# Patient Record
Sex: FEMALE | Race: WHITE | Employment: UNEMPLOYED | URBAN - METROPOLITAN AREA
[De-identification: names, ages, dates, MRNs, and addresses within clinical notes are randomized per-mention and may not be internally consistent; named-entity substitution may affect disease eponyms.]

---

## 2020-07-22 RX ORDER — CALCIUM CARBONATE/VITAMIN D3 600MG-5MCG
1 TABLET ORAL DAILY
Status: ON HOLD | COMMUNITY
End: 2020-07-23

## 2020-07-22 RX ORDER — LEVOTHYROXINE SODIUM 25 UG/1
25 TABLET ORAL
COMMUNITY

## 2020-07-22 RX ORDER — SIMVASTATIN 40 MG/1
TABLET, FILM COATED ORAL
Status: ON HOLD | COMMUNITY
End: 2020-07-23

## 2020-07-22 RX ORDER — CELECOXIB 100 MG/1
CAPSULE ORAL 2 TIMES DAILY
Status: ON HOLD | COMMUNITY
End: 2020-07-23

## 2020-07-22 RX ORDER — CONJUGATED ESTROGENS 0.62 MG/G
0.5 CREAM VAGINAL
COMMUNITY

## 2020-07-22 RX ORDER — LISINOPRIL 10 MG/1
TABLET ORAL DAILY
COMMUNITY

## 2020-07-22 RX ORDER — LANOLIN ALCOHOL/MO/W.PET/CERES
400 CREAM (GRAM) TOPICAL
COMMUNITY

## 2020-07-22 RX ORDER — FAMOTIDINE 40 MG/1
40 TABLET, FILM COATED ORAL 2 TIMES DAILY
Status: ON HOLD | COMMUNITY
End: 2020-07-23

## 2020-07-22 RX ORDER — THERA TABS 400 MCG
1 TAB ORAL DAILY
Status: ON HOLD | COMMUNITY
End: 2020-07-23

## 2020-07-22 RX ORDER — TESTOSTERONE 50 MG/5G
5 GEL TRANSDERMAL DAILY
Status: ON HOLD | COMMUNITY
End: 2020-07-23

## 2020-07-22 RX ORDER — OMEPRAZOLE 40 MG/1
40 CAPSULE, DELAYED RELEASE ORAL DAILY
Status: ON HOLD | COMMUNITY
End: 2020-07-23

## 2020-07-22 RX ORDER — CYCLOBENZAPRINE HCL 5 MG
5 TABLET ORAL
COMMUNITY

## 2020-07-22 RX ORDER — CYCLOSPORINE 0.5 MG/ML
1 EMULSION OPHTHALMIC EVERY 12 HOURS
Status: ON HOLD | COMMUNITY
End: 2020-07-23

## 2020-07-22 RX ORDER — TRAZODONE HYDROCHLORIDE 50 MG/1
TABLET ORAL
COMMUNITY

## 2020-07-23 ENCOUNTER — HOSPITAL ENCOUNTER (OUTPATIENT)
Age: 65
Discharge: HOME | End: 2020-07-24
Attending: INTERNAL MEDICINE | Admitting: INTERNAL MEDICINE
Payer: MEDICARE

## 2020-07-23 DIAGNOSIS — R94.39 ABNORMAL STRESS TEST: ICD-10-CM

## 2020-07-23 PROBLEM — Z95.5 STENTED CORONARY ARTERY: Status: ACTIVE | Noted: 2020-07-23

## 2020-07-23 LAB
ACT BLD: 406 SECS (ref 79–138)
ANION GAP SERPL CALC-SCNC: 5 MMOL/L (ref 3–18)
BUN SERPL-MCNC: 18 MG/DL (ref 7–18)
BUN/CREAT SERPL: 21 (ref 12–20)
CALCIUM SERPL-MCNC: 8.6 MG/DL (ref 8.5–10.1)
CHLORIDE SERPL-SCNC: 108 MMOL/L (ref 100–111)
CHOLEST SERPL-MCNC: 201 MG/DL
CO2 SERPL-SCNC: 27 MMOL/L (ref 21–32)
CREAT SERPL-MCNC: 0.86 MG/DL (ref 0.6–1.3)
ERYTHROCYTE [DISTWIDTH] IN BLOOD BY AUTOMATED COUNT: 12.6 % (ref 11.6–14.5)
GLUCOSE SERPL-MCNC: 87 MG/DL (ref 74–99)
HCT VFR BLD AUTO: 37.2 % (ref 35–45)
HCT VFR BLD AUTO: 39.6 % (ref 35–45)
HDLC SERPL-MCNC: 64 MG/DL (ref 40–60)
HDLC SERPL: 3.1 {RATIO} (ref 0–5)
HGB BLD-MCNC: 12.1 G/DL (ref 12–16)
HGB BLD-MCNC: 12.7 G/DL (ref 12–16)
INR PPP: 1 (ref 0.8–1.2)
LDLC SERPL CALC-MCNC: 116.4 MG/DL (ref 0–100)
LIPID PROFILE,FLP: ABNORMAL
MAGNESIUM SERPL-MCNC: 2.2 MG/DL (ref 1.6–2.6)
MCH RBC QN AUTO: 30.2 PG (ref 24–34)
MCHC RBC AUTO-ENTMCNC: 32.1 G/DL (ref 31–37)
MCV RBC AUTO: 94.3 FL (ref 74–97)
PLATELET # BLD AUTO: 178 K/UL (ref 135–420)
PMV BLD AUTO: 11.4 FL (ref 9.2–11.8)
POTASSIUM SERPL-SCNC: 4.2 MMOL/L (ref 3.5–5.5)
PROTHROMBIN TIME: 13.4 SEC (ref 11.5–15.2)
RBC # BLD AUTO: 4.2 M/UL (ref 4.2–5.3)
SODIUM SERPL-SCNC: 140 MMOL/L (ref 136–145)
TRIGL SERPL-MCNC: 103 MG/DL (ref ?–150)
VLDLC SERPL CALC-MCNC: 20.6 MG/DL
WBC # BLD AUTO: 4.8 K/UL (ref 4.6–13.2)

## 2020-07-23 PROCEDURE — 77030013519 HC DEV INFL BASIX MRTM -B: Performed by: INTERNAL MEDICINE

## 2020-07-23 PROCEDURE — 99218 HC RM OBSERVATION: CPT

## 2020-07-23 PROCEDURE — 77030004522 HC CATH ANGI DX EXPO BSC -A: Performed by: INTERNAL MEDICINE

## 2020-07-23 PROCEDURE — C9113 INJ PANTOPRAZOLE SODIUM, VIA: HCPCS | Performed by: INTERNAL MEDICINE

## 2020-07-23 PROCEDURE — C1894 INTRO/SHEATH, NON-LASER: HCPCS | Performed by: INTERNAL MEDICINE

## 2020-07-23 PROCEDURE — 99153 MOD SED SAME PHYS/QHP EA: CPT | Performed by: INTERNAL MEDICINE

## 2020-07-23 PROCEDURE — 80048 BASIC METABOLIC PNL TOTAL CA: CPT

## 2020-07-23 PROCEDURE — 77030008584 HC TOOL GDWRE DEV TERU -A: Performed by: INTERNAL MEDICINE

## 2020-07-23 PROCEDURE — 93005 ELECTROCARDIOGRAM TRACING: CPT

## 2020-07-23 PROCEDURE — 85610 PROTHROMBIN TIME: CPT

## 2020-07-23 PROCEDURE — 77030029997 HC DEV COM RDL R BND TELE -B: Performed by: INTERNAL MEDICINE

## 2020-07-23 PROCEDURE — 92928 PRQ TCAT PLMT NTRAC ST 1 LES: CPT | Performed by: INTERNAL MEDICINE

## 2020-07-23 PROCEDURE — 74011250636 HC RX REV CODE- 250/636: Performed by: INTERNAL MEDICINE

## 2020-07-23 PROCEDURE — 99152 MOD SED SAME PHYS/QHP 5/>YRS: CPT | Performed by: INTERNAL MEDICINE

## 2020-07-23 PROCEDURE — C1725 CATH, TRANSLUMIN NON-LASER: HCPCS | Performed by: INTERNAL MEDICINE

## 2020-07-23 PROCEDURE — 85027 COMPLETE CBC AUTOMATED: CPT

## 2020-07-23 PROCEDURE — 77030013797 HC KT TRNSDUC PRSSR EDWD -A: Performed by: INTERNAL MEDICINE

## 2020-07-23 PROCEDURE — 74011000258 HC RX REV CODE- 258: Performed by: INTERNAL MEDICINE

## 2020-07-23 PROCEDURE — C1769 GUIDE WIRE: HCPCS | Performed by: INTERNAL MEDICINE

## 2020-07-23 PROCEDURE — 74011000250 HC RX REV CODE- 250: Performed by: INTERNAL MEDICINE

## 2020-07-23 PROCEDURE — C1874 STENT, COATED/COV W/DEL SYS: HCPCS | Performed by: INTERNAL MEDICINE

## 2020-07-23 PROCEDURE — 80061 LIPID PANEL: CPT

## 2020-07-23 PROCEDURE — 74011250637 HC RX REV CODE- 250/637: Performed by: INTERNAL MEDICINE

## 2020-07-23 PROCEDURE — 36415 COLL VENOUS BLD VENIPUNCTURE: CPT

## 2020-07-23 PROCEDURE — 85014 HEMATOCRIT: CPT

## 2020-07-23 PROCEDURE — 74011636320 HC RX REV CODE- 636/320: Performed by: INTERNAL MEDICINE

## 2020-07-23 PROCEDURE — 93458 L HRT ARTERY/VENTRICLE ANGIO: CPT | Performed by: INTERNAL MEDICINE

## 2020-07-23 PROCEDURE — C1887 CATHETER, GUIDING: HCPCS | Performed by: INTERNAL MEDICINE

## 2020-07-23 PROCEDURE — 85347 COAGULATION TIME ACTIVATED: CPT

## 2020-07-23 PROCEDURE — 77030008543 HC TBNG MON PRSS MRTM -A: Performed by: INTERNAL MEDICINE

## 2020-07-23 PROCEDURE — 83735 ASSAY OF MAGNESIUM: CPT

## 2020-07-23 DEVICE — XIENCE SIERRA™ EVEROLIMUS ELUTING CORONARY STENT SYSTEM 2.50 MM X 28 MM / RAPID-EXCHANGE
Type: IMPLANTABLE DEVICE | Status: FUNCTIONAL
Brand: XIENCE SIERRA™

## 2020-07-23 DEVICE — XIENCE SIERRA™ EVEROLIMUS ELUTING CORONARY STENT SYSTEM 2.50 MM X 38 MM / RAPID-EXCHANGE
Type: IMPLANTABLE DEVICE | Status: FUNCTIONAL
Brand: XIENCE SIERRA™

## 2020-07-23 RX ORDER — SODIUM CHLORIDE 9 MG/ML
75 INJECTION, SOLUTION INTRAVENOUS CONTINUOUS
Status: DISPENSED | OUTPATIENT
Start: 2020-07-23 | End: 2020-07-23

## 2020-07-23 RX ORDER — METOPROLOL TARTRATE 25 MG/1
12.5 TABLET, FILM COATED ORAL EVERY 12 HOURS
Status: DISCONTINUED | OUTPATIENT
Start: 2020-07-24 | End: 2020-07-24 | Stop reason: HOSPADM

## 2020-07-23 RX ORDER — SODIUM CHLORIDE 0.9 % (FLUSH) 0.9 %
5-40 SYRINGE (ML) INJECTION AS NEEDED
Status: DISCONTINUED | OUTPATIENT
Start: 2020-07-23 | End: 2020-07-24 | Stop reason: HOSPADM

## 2020-07-23 RX ORDER — ATORVASTATIN CALCIUM 20 MG/1
20 TABLET, FILM COATED ORAL DAILY
Status: DISCONTINUED | OUTPATIENT
Start: 2020-07-24 | End: 2020-07-24 | Stop reason: HOSPADM

## 2020-07-23 RX ORDER — ENOXAPARIN SODIUM 100 MG/ML
40 INJECTION SUBCUTANEOUS EVERY 24 HOURS
Status: DISCONTINUED | OUTPATIENT
Start: 2020-07-24 | End: 2020-07-24 | Stop reason: HOSPADM

## 2020-07-23 RX ORDER — ONDANSETRON 2 MG/ML
4 INJECTION INTRAMUSCULAR; INTRAVENOUS ONCE
Status: ACTIVE | OUTPATIENT
Start: 2020-07-23 | End: 2020-07-24

## 2020-07-23 RX ORDER — SODIUM CHLORIDE 0.9 % (FLUSH) 0.9 %
5-40 SYRINGE (ML) INJECTION EVERY 8 HOURS
Status: DISCONTINUED | OUTPATIENT
Start: 2020-07-23 | End: 2020-07-24 | Stop reason: HOSPADM

## 2020-07-23 RX ORDER — GUAIFENESIN 100 MG/5ML
81 LIQUID (ML) ORAL ONCE
Status: COMPLETED | OUTPATIENT
Start: 2020-07-23 | End: 2020-07-23

## 2020-07-23 RX ORDER — PANTOPRAZOLE SODIUM 40 MG/1
40 TABLET, DELAYED RELEASE ORAL DAILY
COMMUNITY

## 2020-07-23 RX ORDER — FENTANYL CITRATE 50 UG/ML
INJECTION, SOLUTION INTRAMUSCULAR; INTRAVENOUS AS NEEDED
Status: DISCONTINUED | OUTPATIENT
Start: 2020-07-23 | End: 2020-07-23 | Stop reason: HOSPADM

## 2020-07-23 RX ORDER — LEVOTHYROXINE SODIUM 25 UG/1
25 TABLET ORAL
Status: DISCONTINUED | OUTPATIENT
Start: 2020-07-24 | End: 2020-07-24 | Stop reason: HOSPADM

## 2020-07-23 RX ORDER — HEPARIN SODIUM 1000 [USP'U]/ML
INJECTION, SOLUTION INTRAVENOUS; SUBCUTANEOUS AS NEEDED
Status: DISCONTINUED | OUTPATIENT
Start: 2020-07-23 | End: 2020-07-23 | Stop reason: HOSPADM

## 2020-07-23 RX ORDER — ASPIRIN 81 MG/1
81 TABLET ORAL DAILY
Status: DISCONTINUED | OUTPATIENT
Start: 2020-07-24 | End: 2020-07-24 | Stop reason: HOSPADM

## 2020-07-23 RX ORDER — LIDOCAINE HYDROCHLORIDE 10 MG/ML
INJECTION INFILTRATION; PERINEURAL AS NEEDED
Status: DISCONTINUED | OUTPATIENT
Start: 2020-07-23 | End: 2020-07-23 | Stop reason: HOSPADM

## 2020-07-23 RX ORDER — LISINOPRIL 5 MG/1
10 TABLET ORAL DAILY
Status: DISCONTINUED | OUTPATIENT
Start: 2020-07-24 | End: 2020-07-24 | Stop reason: HOSPADM

## 2020-07-23 RX ORDER — VERAPAMIL HYDROCHLORIDE 2.5 MG/ML
INJECTION, SOLUTION INTRAVENOUS AS NEEDED
Status: DISCONTINUED | OUTPATIENT
Start: 2020-07-23 | End: 2020-07-23 | Stop reason: HOSPADM

## 2020-07-23 RX ORDER — HEPARIN SODIUM 200 [USP'U]/100ML
INJECTION, SOLUTION INTRAVENOUS
Status: COMPLETED | OUTPATIENT
Start: 2020-07-23 | End: 2020-07-23

## 2020-07-23 RX ORDER — MIDAZOLAM HYDROCHLORIDE 1 MG/ML
INJECTION, SOLUTION INTRAMUSCULAR; INTRAVENOUS AS NEEDED
Status: DISCONTINUED | OUTPATIENT
Start: 2020-07-23 | End: 2020-07-23 | Stop reason: HOSPADM

## 2020-07-23 RX ORDER — SODIUM CHLORIDE 9 MG/ML
25 INJECTION, SOLUTION INTRAVENOUS CONTINUOUS
Status: DISCONTINUED | OUTPATIENT
Start: 2020-07-23 | End: 2020-07-23

## 2020-07-23 RX ADMIN — BIVALIRUDIN: 250 INJECTION, POWDER, LYOPHILIZED, FOR SOLUTION INTRAVENOUS at 10:40

## 2020-07-23 RX ADMIN — SODIUM CHLORIDE 75 ML/HR: 900 INJECTION, SOLUTION INTRAVENOUS at 12:33

## 2020-07-23 RX ADMIN — ASPIRIN 81 MG 81 MG: 81 TABLET ORAL at 08:32

## 2020-07-23 RX ADMIN — SODIUM CHLORIDE 25 ML/HR: 900 INJECTION, SOLUTION INTRAVENOUS at 08:30

## 2020-07-23 RX ADMIN — PANTOPRAZOLE SODIUM 40 MG: 40 INJECTION, POWDER, FOR SOLUTION INTRAVENOUS at 10:20

## 2020-07-23 RX ADMIN — TICAGRELOR 90 MG: 90 TABLET ORAL at 23:31

## 2020-07-23 NOTE — Clinical Note
TRANSFER - IN REPORT:     Verbal report received from: rn.     Report consisted of patient's Situation, Background, Assessment and   Recommendations(SBAR). Opportunity for questions and clarification was provided. Assessment completed upon patient's arrival to unit and care assumed. Patient transported with a Registered Nurse and [address] [place] [address] / [facility].

## 2020-07-23 NOTE — PROGRESS NOTES
Checked access site. No hematoma noted. Pt mentioned base of thumb feeling a bit numb. Distal radial pulse in tact and Pulse O2 still registering 100%.

## 2020-07-23 NOTE — Clinical Note
Stent inserted. Stent deployed. Multiple inflations used. First inflation pressure = 9 millie; inflation time: 11 sec.

## 2020-07-23 NOTE — PROGRESS NOTES
Pt is all prepped and ready for procedure. 0925 Pt picked up for procedure. 1050 Pt back to care unit S/P cardiac cath. Pt awake and alert and tolerated procedure well. Tr band to rt wrist with immobilizer in place. Site WNL. 1215 Pt noted vagaling, low HR , in the 40's ,B/P 69/48  c/o nausea and not feeling well. Pt placed in trendelenburg and Zofran 4 mg adm stat. IVF wide open. DR Zaragoza called asap    3265 B/P 92/41 , Dr Justine Catalan @ bedside speaking with patient. 1230 B/p 104/53, HR 55, spo2 99%. Pt stated \" I feel much better\" EKG  Done @ bedside. Pt continue to feel better , no further events    1700 Bed assigned and patient taken to room 337 to be admitted.

## 2020-07-23 NOTE — Clinical Note
TRANSFER - OUT REPORT:     Verbal report given to: rn.     Report consisted of patient's Situation, Background, Assessment and   Recommendations(SBAR). Opportunity for questions and clarification was provided. Patient transported with a Registered Nurse and [address] [place] [address] / [facility] [employer]. Patient transported to: care unit.

## 2020-07-23 NOTE — PROGRESS NOTES
TRANSFER - IN REPORT:    Verbal report received from [address] RN(name) on Yvon Quiroz  being received from Care Unit (unit) for routine progression of care      Report consisted of patients Situation, Background, Assessment and   Recommendations(SBAR). Information from the following report(s) SBAR, Kardex, ED Summary, Intake/Output, MAR and Accordion was reviewed with the receiving nurse. Opportunity for questions and clarification was provided. Assessment completed upon patients arrival to unit and care assumed. Shift Summary: Shift uneventful. No complaints of chest pain or shortness of breath. Call light is within reach.

## 2020-07-23 NOTE — PROGRESS NOTES
Patient and post procedure report received. Lunch provided to patient and tolerating well. Angiomax to run until 12:47pm and air to be released from TR band starting at 13:47.

## 2020-07-23 NOTE — PROGRESS NOTES
Bedside shift change report given to Johanna Chiu RN (oncoming nurse) by Edilson Hidalgo RN (offgoing nurse). Report included the following information SBAR, Kardex and ED Summary.

## 2020-07-23 NOTE — PROGRESS NOTES
Cardiology Progress Note        Patient: Raul Batres        Sex: female          DOA: 7/23/2020  YOB: 1955      Age:  72 y.o.        LOS:  LOS: 0 days   Assessment/Plan   Date of Surgery Update:  Raul Batres was seen and examined. History and physical has been reviewed. The patient has been examined.  There have been no significant clinical changes since the completion of the originally dated History and Physical.    Signed By: David Chawla MD     July 23, 2020 9:34 AM             Signed By: David Chawla MD     July 23, 2020

## 2020-07-23 NOTE — PROGRESS NOTES
Started to release TR band at 13:47. Pt c/o some bruising and tenderness however, no hematoma was present. Tolerating procedure well and will release 2cc air every 5 mins until complete.

## 2020-07-23 NOTE — Clinical Note
Balloon inserted. Balloon inflated using multiple inflations inflation technique. Lesion #1: Pressure = 6 millie; Duration = 10 sec. Inflation 2: Pressure = 6 millie; Duration = 7 sec.

## 2020-07-23 NOTE — PROGRESS NOTES
Pt resting comfortably pending room assignment. Radial pulse is strong and intact (2+) above and below percutaneous stick site.

## 2020-07-23 NOTE — DISCHARGE INSTRUCTIONS
Cardiac Catheterization/Angiography Discharge Instructions 7/23/2020    *Check the puncture site frequently for swelling or bleeding. If you see any bleeding, lie down and apply pressure over the area with a clean town or washcloth. Notify your doctor for any redness, swelling, drainage or oozing from the puncture site. Notify your doctor for any fever or chills. *If the leg or arm with the puncture becomes cold, numb or painful, call Dr Alisha Cruz     *Activity should be limited for the next 48 hours. Climb stairs as little as possible and avoid any stooping, bending or strenuous activity for 48 hours. No heavy lifting (anything over 10 pounds) for three days. *Do not drive for 48 hours. *You may resume your usual diet. Drink more fluids than usual.    *Have a responsible person drive you home and stay with you for at least 24 hours after your heart catheterization/angiography. *You may remove the bandage from your Right and Arm in 24 hours. You may shower in 24 hours. No tub baths, hot tubs or swimming for one week. Do not place any lotions, creams, powders, ointments over the puncture site for one week. You may place a clean band-aid over the puncture site each day for 5 days. Change this daily.

## 2020-07-23 NOTE — Clinical Note
Stent inserted. Stent deployed. Single technique and multiple inflations used. First inflation pressure = 9 millie; inflation time: 11 sec.

## 2020-07-23 NOTE — Clinical Note
Bilateral groin and right radial clipped prepped with ChloraPrep and draped. Wet prep elapsed drying time: 5 mins.

## 2020-07-23 NOTE — ROUTINE PROCESS
Cardiac Cath Lab:  Pre Procedure Chart Check     Patients chart was accessed and reviewed for possible and/or scheduled procedure. Creatinine Clearance:  Serum creatinine: 0.86 mg/dL 07/23/20 0755  Estimated creatinine clearance: 71.1 mL/min    Total Contrast  Load:  3 x estimated clearance amount=  213.3ml    75% of Contrast Load:  0.75 x Total Contrast Load=    159.9ml    Recent Labs     07/23/20  0755   WBC 4.8   RBC 4.20   HCT 39.6   HGB 12.7         K 4.2   BUN 18   CREA 0.86   GFRAA >60   GFRNA >60   CA 8.6       BMI: Body mass index is 29.86 kg/m². ALLERGIES:   Allergies   Allergen Reactions    Clotrimazole Other (comments)     Burning of vagina    Keflex [Cephalexin] Other (comments)     Yeast infections    Miconazole Nitrate Other (comments)     Burning of vagina       Lines:        Peripheral IV 07/23/20 Left Antecubital (Active)   Site Assessment Clean, dry, & intact 07/23/20 0825   Phlebitis Assessment 0 07/23/20 0825   Infiltration Assessment 0 07/23/20 0825   Dressing Status Clean, dry, & intact 07/23/20 0825   Dressing Type Tape;Transparent 07/23/20 0825   Hub Color/Line Status Capped; Infusing;Pink;Flushed 07/23/20 0825   Action Taken Open ports on tubing capped 07/23/20 0825   Alcohol Cap Used Yes 07/23/20 0825          History:    Past Medical History:   Diagnosis Date    Arthritis     hands    Chronic pain     right SI joint    GERD (gastroesophageal reflux disease)     Hypertension     Other ill-defined conditions(799.89)     high cholestrol     Past Surgical History:   Procedure Laterality Date    HX GI      colonoscopy    HX HYSTERECTOMY  2006    TVH    HX OOPHORECTOMY Bilateral 2006    HX SHOULDER ARTHROSCOPY Left 2011    HX SHOULDER ARTHROSCOPY Right 2019    HX TONSILLECTOMY  1976    HX UROLOGICAL      cystoscopy    AR BIOPSY OF BREAST, INCISIONAL Right     fibroadenoma     Patient Active Problem List   Diagnosis Code    GERD (gastroesophageal reflux disease) K21.9    Diarrhea R19.7

## 2020-07-23 NOTE — Clinical Note
Balloon inserted. Balloon inflated using multiple inflations inflation technique. Lesion #1: Pressure = 12 millie; Duration = 12 sec. Inflation 2: Pressure = 12 millie; Duration = 7 sec. Inflation 3: Pressure = 12 millie; Duration = 5 sec. Inflation 4: Pressure = 16 millie; Duration = 15 sec.

## 2020-07-23 NOTE — Clinical Note
Balloon inserted. Balloon inflated using multiple inflations inflation technique. Lesion #1: Pressure = 6 millie; Duration = 10 sec. Inflation 2: Pressure = 6 millie; Duration = 10 sec. Inflation 3: Pressure = 6 millie; Duration = 6 sec. Inflation 4: Pressure = 6 millie; Duration = 6 sec. Inflation 5: Pressure = 6 millie; Duration = 5 sec. Inflation 6: Pressure = 6 millie; Duration = 5 sec. Inflation 7: Pressure = 8 millie; Duration = 5 sec.

## 2020-07-23 NOTE — PROGRESS NOTES
TR band completely released. Dry steriled dressing applied and hand rotated to prone position in wrist splint. Pt Tolerated procedure well. No hematoma noted. Tender on palpation.

## 2020-07-24 VITALS
DIASTOLIC BLOOD PRESSURE: 56 MMHG | RESPIRATION RATE: 18 BRPM | OXYGEN SATURATION: 98 % | TEMPERATURE: 98.5 F | SYSTOLIC BLOOD PRESSURE: 97 MMHG | BODY MASS INDEX: 29.73 KG/M2 | HEART RATE: 65 BPM | HEIGHT: 66 IN | WEIGHT: 185 LBS

## 2020-07-24 LAB
ANION GAP SERPL CALC-SCNC: 6 MMOL/L (ref 3–18)
ATRIAL RATE: 54 BPM
ATRIAL RATE: 56 BPM
BUN SERPL-MCNC: 14 MG/DL (ref 7–18)
BUN/CREAT SERPL: 18 (ref 12–20)
CALCIUM SERPL-MCNC: 8 MG/DL (ref 8.5–10.1)
CALCULATED P AXIS, ECG09: 62 DEGREES
CALCULATED P AXIS, ECG09: 63 DEGREES
CALCULATED R AXIS, ECG10: 52 DEGREES
CALCULATED R AXIS, ECG10: 68 DEGREES
CALCULATED T AXIS, ECG11: 73 DEGREES
CALCULATED T AXIS, ECG11: 79 DEGREES
CHLORIDE SERPL-SCNC: 111 MMOL/L (ref 100–111)
CO2 SERPL-SCNC: 26 MMOL/L (ref 21–32)
CRD SYSTOLIC BP: 128
CREAT SERPL-MCNC: 0.78 MG/DL (ref 0.6–1.3)
DIAGNOSIS, 93000: NORMAL
DIAGNOSIS, 93000: NORMAL
ERYTHROCYTE [DISTWIDTH] IN BLOOD BY AUTOMATED COUNT: 12.8 % (ref 11.6–14.5)
GLUCOSE SERPL-MCNC: 91 MG/DL (ref 74–99)
HCT VFR BLD AUTO: 37.1 % (ref 35–45)
HGB BLD-MCNC: 11.7 G/DL (ref 12–16)
MCH RBC QN AUTO: 30.3 PG (ref 24–34)
MCHC RBC AUTO-ENTMCNC: 31.5 G/DL (ref 31–37)
MCV RBC AUTO: 96.1 FL (ref 74–97)
P-R INTERVAL, ECG05: 148 MS
P-R INTERVAL, ECG05: 164 MS
PLATELET # BLD AUTO: 163 K/UL (ref 135–420)
PMV BLD AUTO: 11.4 FL (ref 9.2–11.8)
POTASSIUM SERPL-SCNC: 4 MMOL/L (ref 3.5–5.5)
Q-T INTERVAL, ECG07: 458 MS
Q-T INTERVAL, ECG07: 458 MS
QRS DURATION, ECG06: 80 MS
QRS DURATION, ECG06: 84 MS
QTC CALCULATION (BEZET), ECG08: 434 MS
QTC CALCULATION (BEZET), ECG08: 441 MS
RBC # BLD AUTO: 3.86 M/UL (ref 4.2–5.3)
SODIUM SERPL-SCNC: 143 MMOL/L (ref 136–145)
VENTRICULAR RATE, ECG03: 54 BPM
VENTRICULAR RATE, ECG03: 56 BPM
WBC # BLD AUTO: 6.6 K/UL (ref 4.6–13.2)

## 2020-07-24 PROCEDURE — 85027 COMPLETE CBC AUTOMATED: CPT

## 2020-07-24 PROCEDURE — 99218 HC RM OBSERVATION: CPT

## 2020-07-24 PROCEDURE — 74011250637 HC RX REV CODE- 250/637: Performed by: INTERNAL MEDICINE

## 2020-07-24 PROCEDURE — 36415 COLL VENOUS BLD VENIPUNCTURE: CPT

## 2020-07-24 PROCEDURE — 80048 BASIC METABOLIC PNL TOTAL CA: CPT

## 2020-07-24 PROCEDURE — 74011250636 HC RX REV CODE- 250/636: Performed by: INTERNAL MEDICINE

## 2020-07-24 RX ADMIN — ATORVASTATIN CALCIUM 20 MG: 20 TABLET, FILM COATED ORAL at 09:20

## 2020-07-24 RX ADMIN — Medication 10 ML: at 09:21

## 2020-07-24 RX ADMIN — TICAGRELOR 90 MG: 90 TABLET ORAL at 11:30

## 2020-07-24 RX ADMIN — ASPIRIN 81 MG: 81 TABLET, COATED ORAL at 09:20

## 2020-07-24 RX ADMIN — Medication 10 ML: at 15:13

## 2020-07-24 RX ADMIN — LEVOTHYROXINE SODIUM 25 MCG: 25 TABLET ORAL at 07:30

## 2020-07-24 RX ADMIN — LISINOPRIL 10 MG: 5 TABLET ORAL at 09:20

## 2020-07-24 RX ADMIN — ENOXAPARIN SODIUM 40 MG: 40 INJECTION SUBCUTANEOUS at 09:20

## 2020-07-24 NOTE — PROGRESS NOTES
Reason for Admission:   Abnormal stress test                   RUR Score:     low                Plan for utilizing home health:      no    PCP: First and Last name:  [id number] Susana Name of Practice:    Are you a current patient: Yes/No:    Approximate date of last visit:    Can you participate in a virtual visit with your PCP:                     Current Advanced Directive/Advance Care Plan: On chart  Transition of Care Plan:     Chart reviewed and spoke with pt at bedside, cm introduced self and explained cm role as d/c planner, pt informed cm that when discharged she plans to return back home with spouse, pt admitted due to abnormal stress test, cath lab procedure done yesterday, when discharged pt will f/u with  and cardiac rehab,pt denies any home DME's prior to admission, independent with care, cm will remain available .   Care Management Interventions  PCP Verified by CM: Yes(pascual)  Palliative Care Criteria Met (RRAT>21 & CHF Dx)?: No  Mode of Transport at Discharge: Self(spouse)  Current Support Network: Lives with Spouse  Confirm Follow Up Transport: Self  The Plan for Transition of Care is Related to the Following Treatment Goals : returning back home with spouse /cardiac rehab  Discharge Location  Discharge Placement: Home

## 2020-07-24 NOTE — PROGRESS NOTES
Bedside shift change report given to [address] [place] [address] (oncoming nurse) by Gaurav Valentine RN  (offgoing nurse). Report included the following information SBAR, Kardex, ED Summary, Intake/Output, MAR and Accordion. 0900 Shift assessment complete. Dual AVS reviewed with Margie DIAL RN. All medications reviewed individually with patient. Opportunities for questions and concerns provided. Patient discharged via (mode of transport ie. Car, ambulance or air transport) car with family. Patient's arm band appropriately discarded. Shift Summary: Shift uneventful. No complaints of chest pain or shortness of breath.

## 2020-07-24 NOTE — PROGRESS NOTES
1915  Assumed care of pt   2000  Shift assessment complete   0000  Reassessment complete   0400  Reassessment complete       Shift uneventful     Bedside and Verbal shift change report given to Madan Fitch RN (oncoming nurse) by Nelson Pearson RN (offgoing nurse). Report included the following information SBAR, Kardex, ED Summary, Intake/Output, MAR, Recent Results, Med Rec Status and Cardiac Rhythm NSR.

## 2022-03-19 PROBLEM — Z95.5 STENTED CORONARY ARTERY: Status: ACTIVE | Noted: 2020-07-23

## 2024-05-02 ENCOUNTER — HOSPITAL ENCOUNTER (INPATIENT)
Facility: HOSPITAL | Age: 69
LOS: 10 days | Discharge: HOME | DRG: 478 | End: 2024-05-13
Attending: STUDENT IN AN ORGANIZED HEALTH CARE EDUCATION/TRAINING PROGRAM | Admitting: INTERNAL MEDICINE
Payer: MEDICARE

## 2024-05-02 DIAGNOSIS — D64.9 ANEMIA, UNSPECIFIED TYPE: ICD-10-CM

## 2024-05-02 DIAGNOSIS — R26.2 UNABLE TO WALK: ICD-10-CM

## 2024-05-02 DIAGNOSIS — R91.8 MULTIPLE LUNG NODULES: ICD-10-CM

## 2024-05-02 DIAGNOSIS — C79.51 METASTATIC CANCER TO BONE (HCC): ICD-10-CM

## 2024-05-02 DIAGNOSIS — E83.52 HYPERCALCEMIA: ICD-10-CM

## 2024-05-02 DIAGNOSIS — R53.1 GENERALIZED WEAKNESS: Primary | ICD-10-CM

## 2024-05-02 DIAGNOSIS — E27.8 ADRENAL NODULE (HCC): ICD-10-CM

## 2024-05-02 DIAGNOSIS — Z95.5 STENTED CORONARY ARTERY: ICD-10-CM

## 2024-05-02 PROCEDURE — 87086 URINE CULTURE/COLONY COUNT: CPT

## 2024-05-02 PROCEDURE — 87186 SC STD MICRODIL/AGAR DIL: CPT

## 2024-05-02 PROCEDURE — 99152 MOD SED SAME PHYS/QHP 5/>YRS: CPT

## 2024-05-02 PROCEDURE — 99285 EMERGENCY DEPT VISIT HI MDM: CPT

## 2024-05-02 PROCEDURE — 87088 URINE BACTERIA CULTURE: CPT

## 2024-05-02 PROCEDURE — 81001 URINALYSIS AUTO W/SCOPE: CPT

## 2024-05-02 RX ORDER — KETOROLAC TROMETHAMINE 15 MG/ML
15 INJECTION, SOLUTION INTRAMUSCULAR; INTRAVENOUS ONCE
Status: COMPLETED | OUTPATIENT
Start: 2024-05-03 | End: 2024-05-03

## 2024-05-02 RX ORDER — 0.9 % SODIUM CHLORIDE 0.9 %
1000 INTRAVENOUS SOLUTION INTRAVENOUS ONCE
Status: COMPLETED | OUTPATIENT
Start: 2024-05-03 | End: 2024-05-03

## 2024-05-02 ASSESSMENT — PAIN SCALES - GENERAL: PAINLEVEL_OUTOF10: 8

## 2024-05-03 ENCOUNTER — APPOINTMENT (OUTPATIENT)
Facility: HOSPITAL | Age: 69
DRG: 478 | End: 2024-05-03
Payer: MEDICARE

## 2024-05-03 ENCOUNTER — APPOINTMENT (OUTPATIENT)
Facility: HOSPITAL | Age: 69
DRG: 478 | End: 2024-05-03
Attending: INTERNAL MEDICINE
Payer: MEDICARE

## 2024-05-03 PROBLEM — C79.51 METASTATIC CANCER TO BONE (HCC): Status: ACTIVE | Noted: 2024-05-03

## 2024-05-03 PROBLEM — N39.0 UTI (URINARY TRACT INFECTION): Status: ACTIVE | Noted: 2024-05-03

## 2024-05-03 PROBLEM — E27.8 ADRENAL NODULE (HCC): Status: ACTIVE | Noted: 2024-05-03

## 2024-05-03 PROBLEM — E83.52 HYPERCALCEMIA: Status: ACTIVE | Noted: 2024-05-03

## 2024-05-03 PROBLEM — R91.1 PULMONARY NODULE: Status: ACTIVE | Noted: 2024-05-03

## 2024-05-03 LAB
ALBUMIN SERPL-MCNC: 2.3 G/DL (ref 3.4–5)
ALBUMIN SERPL-MCNC: 2.4 G/DL (ref 3.4–5)
ALBUMIN/GLOB SERPL: 0.6 (ref 0.8–1.7)
ALBUMIN/GLOB SERPL: 0.7 (ref 0.8–1.7)
ALP SERPL-CCNC: 111 U/L (ref 45–117)
ALP SERPL-CCNC: 115 U/L (ref 45–117)
ALT SERPL-CCNC: 14 U/L (ref 13–56)
ALT SERPL-CCNC: 14 U/L (ref 13–56)
ANION GAP SERPL CALC-SCNC: 7 MMOL/L (ref 3–18)
ANION GAP SERPL CALC-SCNC: 8 MMOL/L (ref 3–18)
APPEARANCE UR: CLEAR
APTT PPP: 156.8 SEC (ref 23–36.4)
APTT PPP: 26 SEC (ref 23–36.4)
AST SERPL-CCNC: 10 U/L (ref 10–38)
AST SERPL-CCNC: 11 U/L (ref 10–38)
BACTERIA URNS QL MICRO: ABNORMAL /HPF
BASOPHILS # BLD: 0 K/UL (ref 0–0.1)
BASOPHILS NFR BLD: 0 % (ref 0–2)
BILIRUB SERPL-MCNC: 0.3 MG/DL (ref 0.2–1)
BILIRUB SERPL-MCNC: 0.3 MG/DL (ref 0.2–1)
BILIRUB UR QL: NEGATIVE
BUN SERPL-MCNC: 10 MG/DL (ref 7–18)
BUN SERPL-MCNC: 11 MG/DL (ref 7–18)
BUN/CREAT SERPL: 12 (ref 12–20)
BUN/CREAT SERPL: 14 (ref 12–20)
CA-I SERPL-SCNC: 1.41 MMOL/L (ref 1.12–1.32)
CA-I SERPL-SCNC: 1.41 MMOL/L (ref 1.12–1.32)
CALCIUM SERPL-MCNC: 10.1 MG/DL (ref 8.5–10.1)
CALCIUM SERPL-MCNC: 10.8 MG/DL (ref 8.5–10.1)
CALCIUM SERPL-MCNC: 11 MG/DL (ref 8.5–10.1)
CHLORIDE SERPL-SCNC: 101 MMOL/L (ref 100–111)
CHLORIDE SERPL-SCNC: 104 MMOL/L (ref 100–111)
CK SERPL-CCNC: 14 U/L (ref 26–192)
CO2 SERPL-SCNC: 27 MMOL/L (ref 21–32)
CO2 SERPL-SCNC: 27 MMOL/L (ref 21–32)
COLOR UR: YELLOW
CREAT SERPL-MCNC: 0.79 MG/DL (ref 0.6–1.3)
CREAT SERPL-MCNC: 0.82 MG/DL (ref 0.6–1.3)
D DIMER PPP FEU-MCNC: 4.3 UG/ML(FEU)
DIFFERENTIAL METHOD BLD: ABNORMAL
ECHO AO ROOT DIAM: 3.2 CM
ECHO AO ROOT INDEX: 1.64 CM/M2
ECHO AV AREA PEAK VELOCITY: 2.7 CM2
ECHO AV AREA VTI: 2.9 CM2
ECHO AV AREA/BSA PEAK VELOCITY: 1.4 CM2/M2
ECHO AV AREA/BSA VTI: 1.5 CM2/M2
ECHO AV MEAN GRADIENT: 4 MMHG
ECHO AV MEAN VELOCITY: 0.9 M/S
ECHO AV PEAK GRADIENT: 7 MMHG
ECHO AV PEAK VELOCITY: 1.3 M/S
ECHO AV VELOCITY RATIO: 0.85
ECHO AV VTI: 27.2 CM
ECHO BSA: 2 M2
ECHO BSA: 2 M2
ECHO EST RA PRESSURE: 3 MMHG
ECHO LA DIAMETER INDEX: 1.95 CM/M2
ECHO LA DIAMETER: 3.8 CM
ECHO LA TO AORTIC ROOT RATIO: 1.19
ECHO LA VOL A-L A2C: 53 ML (ref 22–52)
ECHO LA VOL A-L A4C: 56 ML (ref 22–52)
ECHO LA VOL BP: 53 ML (ref 22–52)
ECHO LA VOL MOD A2C: 51 ML (ref 22–52)
ECHO LA VOL MOD A4C: 54 ML (ref 22–52)
ECHO LA VOL/BSA BIPLANE: 27 ML/M2 (ref 16–34)
ECHO LA VOLUME AREA LENGTH: 55 ML
ECHO LA VOLUME INDEX A-L A2C: 27 ML/M2 (ref 16–34)
ECHO LA VOLUME INDEX A-L A4C: 29 ML/M2 (ref 16–34)
ECHO LA VOLUME INDEX AREA LENGTH: 28 ML/M2 (ref 16–34)
ECHO LA VOLUME INDEX MOD A2C: 26 ML/M2 (ref 16–34)
ECHO LA VOLUME INDEX MOD A4C: 28 ML/M2 (ref 16–34)
ECHO LV E' LATERAL VELOCITY: 12 CM/S
ECHO LV E' SEPTAL VELOCITY: 39 CM/S
ECHO LV EDV A2C: 24 ML
ECHO LV EDV A4C: 80 ML
ECHO LV EDV BP: 44 ML (ref 56–104)
ECHO LV EDV INDEX A4C: 41 ML/M2
ECHO LV EDV INDEX BP: 23 ML/M2
ECHO LV EDV NDEX A2C: 12 ML/M2
ECHO LV EJECTION FRACTION A2C: 53 %
ECHO LV EJECTION FRACTION A4C: 56 %
ECHO LV EJECTION FRACTION BIPLANE: 52 % (ref 55–100)
ECHO LV ESV A2C: 12 ML
ECHO LV ESV A4C: 35 ML
ECHO LV ESV BP: 21 ML (ref 19–49)
ECHO LV ESV INDEX A2C: 6 ML/M2
ECHO LV ESV INDEX A4C: 18 ML/M2
ECHO LV ESV INDEX BP: 11 ML/M2
ECHO LV FRACTIONAL SHORTENING: 32 % (ref 28–44)
ECHO LV INTERNAL DIMENSION DIASTOLE INDEX: 2.56 CM/M2
ECHO LV INTERNAL DIMENSION DIASTOLIC: 5 CM (ref 3.9–5.3)
ECHO LV INTERNAL DIMENSION SYSTOLIC INDEX: 1.74 CM/M2
ECHO LV INTERNAL DIMENSION SYSTOLIC: 3.4 CM
ECHO LV IVSD: 1 CM (ref 0.6–0.9)
ECHO LV MASS 2D: 182 G (ref 67–162)
ECHO LV MASS INDEX 2D: 93.3 G/M2 (ref 43–95)
ECHO LV POSTERIOR WALL DIASTOLIC: 1 CM (ref 0.6–0.9)
ECHO LV RELATIVE WALL THICKNESS RATIO: 0.4
ECHO LVOT AREA: 3.5 CM2
ECHO LVOT AV VTI INDEX: 0.87
ECHO LVOT DIAM: 2.1 CM
ECHO LVOT MEAN GRADIENT: 2 MMHG
ECHO LVOT PEAK GRADIENT: 4 MMHG
ECHO LVOT PEAK VELOCITY: 1.1 M/S
ECHO LVOT STROKE VOLUME INDEX: 42.1 ML/M2
ECHO LVOT SV: 82 ML
ECHO LVOT VTI: 23.7 CM
ECHO MV A VELOCITY: 0.91 M/S
ECHO MV AREA PHT: 4.1 CM2
ECHO MV AREA VTI: 3.8 CM2
ECHO MV E DECELERATION TIME (DT): 165.6 MS
ECHO MV E VELOCITY: 0.74 M/S
ECHO MV E/A RATIO: 0.81
ECHO MV E/E' LATERAL: 6.17
ECHO MV E/E' RATIO (AVERAGED): 4.03
ECHO MV LVOT VTI INDEX: 0.9
ECHO MV MAX VELOCITY: 0.9 M/S
ECHO MV MEAN GRADIENT: 2 MMHG
ECHO MV MEAN VELOCITY: 0.7 M/S
ECHO MV PEAK GRADIENT: 3 MMHG
ECHO MV PRESSURE HALF TIME (PHT): 54.1 MS
ECHO MV VTI: 21.4 CM
ECHO PV MAX VELOCITY: 1.1 M/S
ECHO PV PEAK GRADIENT: 5 MMHG
ECHO RA END SYSTOLIC VOLUME APICAL 4 CHAMBER INDEX BSA: 12 ML/M2
ECHO RA VOLUME: 23 ML
ECHO RIGHT VENTRICULAR SYSTOLIC PRESSURE (RVSP): 36 MMHG
ECHO RV FREE WALL PEAK S': 21 CM/S
ECHO RV INTERNAL DIMENSION: 3.4 CM
ECHO RV TAPSE: 2 CM (ref 1.7–?)
ECHO RVOT MEAN GRADIENT: 2 MMHG
ECHO RVOT PEAK GRADIENT: 2 MMHG
ECHO RVOT PEAK VELOCITY: 0.8 M/S
ECHO RVOT VTI: 16.2 CM
ECHO TV REGURGITANT MAX VELOCITY: 2.86 M/S
ECHO TV REGURGITANT PEAK GRADIENT: 33 MMHG
EOSINOPHIL # BLD: 0.1 K/UL (ref 0–0.4)
EOSINOPHIL NFR BLD: 1 % (ref 0–5)
EPITH CASTS URNS QL MICRO: ABNORMAL /LPF (ref 0–5)
ERYTHROCYTE [DISTWIDTH] IN BLOOD BY AUTOMATED COUNT: 14.6 % (ref 11.6–14.5)
ERYTHROCYTE [DISTWIDTH] IN BLOOD BY AUTOMATED COUNT: 14.6 % (ref 11.6–14.5)
FERRITIN SERPL-MCNC: 337 NG/ML (ref 8–388)
GLOBULIN SER CALC-MCNC: 3.4 G/DL (ref 2–4)
GLOBULIN SER CALC-MCNC: 3.7 G/DL (ref 2–4)
GLUCOSE SERPL-MCNC: 102 MG/DL (ref 74–99)
GLUCOSE SERPL-MCNC: 89 MG/DL (ref 74–99)
GLUCOSE UR STRIP.AUTO-MCNC: NEGATIVE MG/DL
HCT VFR BLD AUTO: 28.9 % (ref 35–45)
HCT VFR BLD AUTO: 29.3 % (ref 35–45)
HGB BLD-MCNC: 9.1 G/DL (ref 12–16)
HGB BLD-MCNC: 9.2 G/DL (ref 12–16)
HGB UR QL STRIP: NEGATIVE
HYALINE CASTS URNS QL MICRO: ABNORMAL /LPF (ref 0–2)
IMM GRANULOCYTES # BLD AUTO: 0.1 K/UL (ref 0–0.04)
IMM GRANULOCYTES NFR BLD AUTO: 1 % (ref 0–0.5)
INR PPP: 1.2 (ref 0.9–1.1)
IRON SATN MFR SERPL: 5 % (ref 20–50)
IRON SERPL-MCNC: 12 UG/DL (ref 50–175)
KETONES UR QL STRIP.AUTO: NEGATIVE MG/DL
LACTATE BLD-SCNC: 0.94 MMOL/L (ref 0.4–2)
LDH SERPL L TO P-CCNC: 176 U/L (ref 81–234)
LEUKOCYTE ESTERASE UR QL STRIP.AUTO: ABNORMAL
LIPASE SERPL-CCNC: 13 U/L (ref 13–75)
LYMPHOCYTES # BLD: 1.4 K/UL (ref 0.9–3.6)
LYMPHOCYTES NFR BLD: 21 % (ref 21–52)
MAGNESIUM SERPL-MCNC: 1.5 MG/DL (ref 1.6–2.6)
MCH RBC QN AUTO: 26.1 PG (ref 24–34)
MCH RBC QN AUTO: 26.4 PG (ref 24–34)
MCHC RBC AUTO-ENTMCNC: 31.4 G/DL (ref 31–37)
MCHC RBC AUTO-ENTMCNC: 31.5 G/DL (ref 31–37)
MCV RBC AUTO: 83.2 FL (ref 78–100)
MCV RBC AUTO: 83.8 FL (ref 78–100)
MONOCYTES # BLD: 0.7 K/UL (ref 0.05–1.2)
MONOCYTES NFR BLD: 11 % (ref 3–10)
NEUTS SEG # BLD: 4.4 K/UL (ref 1.8–8)
NEUTS SEG NFR BLD: 66 % (ref 40–73)
NITRITE UR QL STRIP.AUTO: NEGATIVE
NRBC # BLD: 0 K/UL (ref 0–0.01)
NRBC # BLD: 0 K/UL (ref 0–0.01)
NRBC BLD-RTO: 0 PER 100 WBC
NRBC BLD-RTO: 0 PER 100 WBC
PH UR STRIP: 6 (ref 5–8)
PHOSPHATE SERPL-MCNC: 4.5 MG/DL (ref 2.5–4.9)
PLATELET # BLD AUTO: 300 K/UL (ref 135–420)
PLATELET # BLD AUTO: 310 K/UL (ref 135–420)
PMV BLD AUTO: 10.1 FL (ref 9.2–11.8)
PMV BLD AUTO: 10.4 FL (ref 9.2–11.8)
POTASSIUM SERPL-SCNC: 3.8 MMOL/L (ref 3.5–5.5)
POTASSIUM SERPL-SCNC: 3.9 MMOL/L (ref 3.5–5.5)
PROT SERPL-MCNC: 5.7 G/DL (ref 6.4–8.2)
PROT SERPL-MCNC: 6.1 G/DL (ref 6.4–8.2)
PROT UR STRIP-MCNC: NEGATIVE MG/DL
PROTHROMBIN TIME: 15.7 SEC (ref 11.9–14.7)
PTH-INTACT SERPL-MCNC: <6.3 PG/ML (ref 18.4–88)
RBC # BLD AUTO: 3.45 M/UL (ref 4.2–5.3)
RBC # BLD AUTO: 3.52 M/UL (ref 4.2–5.3)
RBC #/AREA URNS HPF: ABNORMAL /HPF (ref 0–5)
SODIUM SERPL-SCNC: 136 MMOL/L (ref 136–145)
SODIUM SERPL-SCNC: 138 MMOL/L (ref 136–145)
SP GR UR REFRACTOMETRY: 1.01 (ref 1–1.03)
TIBC SERPL-MCNC: 226 UG/DL (ref 250–450)
TROPONIN I SERPL HS-MCNC: 15 NG/L (ref 0–54)
TROPONIN I SERPL HS-MCNC: 17 NG/L (ref 0–54)
TROPONIN I SERPL HS-MCNC: 18 NG/L (ref 0–54)
TSH SERPL DL<=0.05 MIU/L-ACNC: 2.62 UIU/ML (ref 0.36–3.74)
UROBILINOGEN UR QL STRIP.AUTO: 0.2 EU/DL (ref 0.2–1)
WBC # BLD AUTO: 6.4 K/UL (ref 4.6–13.2)
WBC # BLD AUTO: 6.6 K/UL (ref 4.6–13.2)
WBC URNS QL MICRO: ABNORMAL /HPF (ref 0–5)

## 2024-05-03 PROCEDURE — 85379 FIBRIN DEGRADATION QUANT: CPT

## 2024-05-03 PROCEDURE — 82550 ASSAY OF CK (CPK): CPT

## 2024-05-03 PROCEDURE — 80053 COMPREHEN METABOLIC PANEL: CPT

## 2024-05-03 PROCEDURE — 84484 ASSAY OF TROPONIN QUANT: CPT

## 2024-05-03 PROCEDURE — 93970 EXTREMITY STUDY: CPT

## 2024-05-03 PROCEDURE — 83550 IRON BINDING TEST: CPT

## 2024-05-03 PROCEDURE — 96375 TX/PRO/DX INJ NEW DRUG ADDON: CPT

## 2024-05-03 PROCEDURE — 87040 BLOOD CULTURE FOR BACTERIA: CPT

## 2024-05-03 PROCEDURE — 83605 ASSAY OF LACTIC ACID: CPT

## 2024-05-03 PROCEDURE — 96365 THER/PROPH/DIAG IV INF INIT: CPT

## 2024-05-03 PROCEDURE — 70450 CT HEAD/BRAIN W/O DYE: CPT

## 2024-05-03 PROCEDURE — 86334 IMMUNOFIX E-PHORESIS SERUM: CPT

## 2024-05-03 PROCEDURE — 73521 X-RAY EXAM HIPS BI 2 VIEWS: CPT

## 2024-05-03 PROCEDURE — 84165 PROTEIN E-PHORESIS SERUM: CPT

## 2024-05-03 PROCEDURE — 6360000004 HC RX CONTRAST MEDICATION: Performed by: PHYSICIAN ASSISTANT

## 2024-05-03 PROCEDURE — 1100000003 HC PRIVATE W/ TELEMETRY

## 2024-05-03 PROCEDURE — 84443 ASSAY THYROID STIM HORMONE: CPT

## 2024-05-03 PROCEDURE — 6360000002 HC RX W HCPCS: Performed by: STUDENT IN AN ORGANIZED HEALTH CARE EDUCATION/TRAINING PROGRAM

## 2024-05-03 PROCEDURE — 6360000002 HC RX W HCPCS: Performed by: PHYSICIAN ASSISTANT

## 2024-05-03 PROCEDURE — 85027 COMPLETE CBC AUTOMATED: CPT

## 2024-05-03 PROCEDURE — 71275 CT ANGIOGRAPHY CHEST: CPT

## 2024-05-03 PROCEDURE — 6370000000 HC RX 637 (ALT 250 FOR IP): Performed by: INTERNAL MEDICINE

## 2024-05-03 PROCEDURE — 71045 X-RAY EXAM CHEST 1 VIEW: CPT

## 2024-05-03 PROCEDURE — 82728 ASSAY OF FERRITIN: CPT

## 2024-05-03 PROCEDURE — 85610 PROTHROMBIN TIME: CPT

## 2024-05-03 PROCEDURE — 93306 TTE W/DOPPLER COMPLETE: CPT

## 2024-05-03 PROCEDURE — 2580000003 HC RX 258: Performed by: INTERNAL MEDICINE

## 2024-05-03 PROCEDURE — 82784 ASSAY IGA/IGD/IGG/IGM EACH: CPT

## 2024-05-03 PROCEDURE — 6360000002 HC RX W HCPCS: Performed by: INTERNAL MEDICINE

## 2024-05-03 PROCEDURE — 84100 ASSAY OF PHOSPHORUS: CPT

## 2024-05-03 PROCEDURE — 6360000002 HC RX W HCPCS: Performed by: HOSPITALIST

## 2024-05-03 PROCEDURE — 2580000003 HC RX 258: Performed by: PHYSICIAN ASSISTANT

## 2024-05-03 PROCEDURE — 96374 THER/PROPH/DIAG INJ IV PUSH: CPT

## 2024-05-03 PROCEDURE — 36415 COLL VENOUS BLD VENIPUNCTURE: CPT

## 2024-05-03 PROCEDURE — 82397 CHEMILUMINESCENT ASSAY: CPT

## 2024-05-03 PROCEDURE — 82330 ASSAY OF CALCIUM: CPT

## 2024-05-03 PROCEDURE — 76604 US EXAM CHEST: CPT

## 2024-05-03 PROCEDURE — 85025 COMPLETE CBC W/AUTO DIFF WBC: CPT

## 2024-05-03 PROCEDURE — 85730 THROMBOPLASTIN TIME PARTIAL: CPT

## 2024-05-03 PROCEDURE — 83615 LACTATE (LD) (LDH) ENZYME: CPT

## 2024-05-03 PROCEDURE — 2580000003 HC RX 258: Performed by: STUDENT IN AN ORGANIZED HEALTH CARE EDUCATION/TRAINING PROGRAM

## 2024-05-03 PROCEDURE — 83735 ASSAY OF MAGNESIUM: CPT

## 2024-05-03 PROCEDURE — 83690 ASSAY OF LIPASE: CPT

## 2024-05-03 PROCEDURE — 83970 ASSAY OF PARATHORMONE: CPT

## 2024-05-03 PROCEDURE — 83540 ASSAY OF IRON: CPT

## 2024-05-03 PROCEDURE — 83521 IG LIGHT CHAINS FREE EACH: CPT

## 2024-05-03 RX ORDER — SODIUM CHLORIDE 0.9 % (FLUSH) 0.9 %
5-40 SYRINGE (ML) INJECTION PRN
Status: DISCONTINUED | OUTPATIENT
Start: 2024-05-03 | End: 2024-05-04

## 2024-05-03 RX ORDER — SODIUM CHLORIDE 0.9 % (FLUSH) 0.9 %
5-40 SYRINGE (ML) INJECTION PRN
Status: DISCONTINUED | OUTPATIENT
Start: 2024-05-03 | End: 2024-05-13 | Stop reason: HOSPADM

## 2024-05-03 RX ORDER — POLYVINYL ALCOHOL 14 MG/ML
2 SOLUTION/ DROPS OPHTHALMIC EVERY 4 HOURS PRN
Status: DISCONTINUED | OUTPATIENT
Start: 2024-05-03 | End: 2024-05-03 | Stop reason: RX

## 2024-05-03 RX ORDER — ACETAMINOPHEN 325 MG/1
650 TABLET ORAL EVERY 6 HOURS PRN
Status: DISCONTINUED | OUTPATIENT
Start: 2024-05-03 | End: 2024-05-13 | Stop reason: HOSPADM

## 2024-05-03 RX ORDER — MAGNESIUM SULFATE IN WATER 40 MG/ML
2000 INJECTION, SOLUTION INTRAVENOUS PRN
Status: DISCONTINUED | OUTPATIENT
Start: 2024-05-03 | End: 2024-05-13 | Stop reason: HOSPADM

## 2024-05-03 RX ORDER — SODIUM CHLORIDE 9 MG/ML
INJECTION, SOLUTION INTRAVENOUS PRN
Status: DISCONTINUED | OUTPATIENT
Start: 2024-05-03 | End: 2024-05-13 | Stop reason: HOSPADM

## 2024-05-03 RX ORDER — POLYETHYLENE GLYCOL 3350 17 G/17G
17 POWDER, FOR SOLUTION ORAL DAILY PRN
Status: DISCONTINUED | OUTPATIENT
Start: 2024-05-03 | End: 2024-05-13 | Stop reason: HOSPADM

## 2024-05-03 RX ORDER — OXYCODONE HYDROCHLORIDE AND ACETAMINOPHEN 5; 325 MG/1; MG/1
TABLET ORAL
COMMUNITY
Start: 2024-04-18

## 2024-05-03 RX ORDER — ONDANSETRON 2 MG/ML
4 INJECTION INTRAMUSCULAR; INTRAVENOUS EVERY 4 HOURS PRN
Status: DISCONTINUED | OUTPATIENT
Start: 2024-05-03 | End: 2024-05-13 | Stop reason: HOSPADM

## 2024-05-03 RX ORDER — POTASSIUM CHLORIDE 20 MEQ/1
40 TABLET, EXTENDED RELEASE ORAL PRN
Status: DISCONTINUED | OUTPATIENT
Start: 2024-05-03 | End: 2024-05-13 | Stop reason: HOSPADM

## 2024-05-03 RX ORDER — LIDOCAINE HYDROCHLORIDE 10 MG/ML
10 INJECTION, SOLUTION EPIDURAL; INFILTRATION; INTRACAUDAL; PERINEURAL ONCE
Status: DISCONTINUED | OUTPATIENT
Start: 2024-05-03 | End: 2024-05-13 | Stop reason: HOSPADM

## 2024-05-03 RX ORDER — MORPHINE SULFATE 4 MG/ML
4 INJECTION, SOLUTION INTRAMUSCULAR; INTRAVENOUS EVERY 4 HOURS PRN
Status: DISCONTINUED | OUTPATIENT
Start: 2024-05-03 | End: 2024-05-13 | Stop reason: HOSPADM

## 2024-05-03 RX ORDER — HEPARIN SODIUM 1000 [USP'U]/ML
40 INJECTION, SOLUTION INTRAVENOUS; SUBCUTANEOUS PRN
Status: DISCONTINUED | OUTPATIENT
Start: 2024-05-03 | End: 2024-05-07

## 2024-05-03 RX ORDER — HEPARIN SODIUM 1000 [USP'U]/ML
80 INJECTION, SOLUTION INTRAVENOUS; SUBCUTANEOUS PRN
Status: DISCONTINUED | OUTPATIENT
Start: 2024-05-03 | End: 2024-05-07

## 2024-05-03 RX ORDER — 0.9 % SODIUM CHLORIDE 0.9 %
1000 INTRAVENOUS SOLUTION INTRAVENOUS ONCE
Status: COMPLETED | OUTPATIENT
Start: 2024-05-03 | End: 2024-05-03

## 2024-05-03 RX ORDER — POTASSIUM CHLORIDE 7.45 MG/ML
10 INJECTION INTRAVENOUS PRN
Status: DISCONTINUED | OUTPATIENT
Start: 2024-05-03 | End: 2024-05-13 | Stop reason: HOSPADM

## 2024-05-03 RX ORDER — MORPHINE SULFATE 4 MG/ML
4 INJECTION, SOLUTION INTRAMUSCULAR; INTRAVENOUS
Status: COMPLETED | OUTPATIENT
Start: 2024-05-03 | End: 2024-05-03

## 2024-05-03 RX ORDER — SODIUM CHLORIDE 0.9 % (FLUSH) 0.9 %
5-40 SYRINGE (ML) INJECTION EVERY 12 HOURS SCHEDULED
Status: DISCONTINUED | OUTPATIENT
Start: 2024-05-03 | End: 2024-05-13 | Stop reason: HOSPADM

## 2024-05-03 RX ORDER — SODIUM CHLORIDE 0.9 % (FLUSH) 0.9 %
5-40 SYRINGE (ML) INJECTION EVERY 12 HOURS SCHEDULED
Status: DISCONTINUED | OUTPATIENT
Start: 2024-05-03 | End: 2024-05-04

## 2024-05-03 RX ORDER — PANTOPRAZOLE SODIUM 40 MG/1
40 TABLET, DELAYED RELEASE ORAL DAILY
Status: DISCONTINUED | OUTPATIENT
Start: 2024-05-03 | End: 2024-05-13 | Stop reason: HOSPADM

## 2024-05-03 RX ORDER — SODIUM CHLORIDE 9 MG/ML
INJECTION, SOLUTION INTRAVENOUS CONTINUOUS
Status: DISCONTINUED | OUTPATIENT
Start: 2024-05-03 | End: 2024-05-05

## 2024-05-03 RX ORDER — MAGNESIUM SULFATE IN WATER 40 MG/ML
2000 INJECTION, SOLUTION INTRAVENOUS ONCE
Status: COMPLETED | OUTPATIENT
Start: 2024-05-03 | End: 2024-05-03

## 2024-05-03 RX ORDER — HEPARIN SODIUM 1000 [USP'U]/ML
80 INJECTION, SOLUTION INTRAVENOUS; SUBCUTANEOUS ONCE
Status: COMPLETED | OUTPATIENT
Start: 2024-05-03 | End: 2024-05-03

## 2024-05-03 RX ORDER — HEPARIN SODIUM 10000 [USP'U]/100ML
5-30 INJECTION, SOLUTION INTRAVENOUS CONTINUOUS
Status: DISCONTINUED | OUTPATIENT
Start: 2024-05-03 | End: 2024-05-07

## 2024-05-03 RX ORDER — OXYCODONE HYDROCHLORIDE AND ACETAMINOPHEN 5; 325 MG/1; MG/1
1 TABLET ORAL EVERY 6 HOURS PRN
Status: DISCONTINUED | OUTPATIENT
Start: 2024-05-03 | End: 2024-05-13 | Stop reason: HOSPADM

## 2024-05-03 RX ORDER — ACETAMINOPHEN 650 MG/1
650 SUPPOSITORY RECTAL EVERY 6 HOURS PRN
Status: DISCONTINUED | OUTPATIENT
Start: 2024-05-03 | End: 2024-05-13 | Stop reason: HOSPADM

## 2024-05-03 RX ORDER — THYROID 60 MG
1 TABLET ORAL DAILY
COMMUNITY
Start: 2024-04-18

## 2024-05-03 RX ORDER — ROSUVASTATIN CALCIUM 10 MG/1
10 TABLET, COATED ORAL NIGHTLY
Status: DISCONTINUED | OUTPATIENT
Start: 2024-05-03 | End: 2024-05-13 | Stop reason: HOSPADM

## 2024-05-03 RX ORDER — ZOLEDRONIC ACID 0.04 MG/ML
4 INJECTION, SOLUTION INTRAVENOUS ONCE
Status: COMPLETED | OUTPATIENT
Start: 2024-05-03 | End: 2024-05-03

## 2024-05-03 RX ORDER — THYROID 30 MG/1
60 TABLET ORAL DAILY
Status: DISCONTINUED | OUTPATIENT
Start: 2024-05-03 | End: 2024-05-13 | Stop reason: HOSPADM

## 2024-05-03 RX ADMIN — MORPHINE SULFATE 4 MG: 4 INJECTION, SOLUTION INTRAMUSCULAR; INTRAVENOUS at 10:48

## 2024-05-03 RX ADMIN — IOPAMIDOL 100 ML: 755 INJECTION, SOLUTION INTRAVENOUS at 02:39

## 2024-05-03 RX ADMIN — THYROID 60 MG: 30 TABLET ORAL at 10:31

## 2024-05-03 RX ADMIN — KETOROLAC TROMETHAMINE 15 MG: 15 INJECTION, SOLUTION INTRAMUSCULAR; INTRAVENOUS at 00:49

## 2024-05-03 RX ADMIN — SODIUM CHLORIDE, PRESERVATIVE FREE 10 ML: 5 INJECTION INTRAVENOUS at 10:38

## 2024-05-03 RX ADMIN — ZOLEDRONIC ACID 4 MG: 0.04 INJECTION, SOLUTION INTRAVENOUS at 10:31

## 2024-05-03 RX ADMIN — SODIUM CHLORIDE: 9 INJECTION, SOLUTION INTRAVENOUS at 05:59

## 2024-05-03 RX ADMIN — MAGNESIUM SULFATE HEPTAHYDRATE 2000 MG: 40 INJECTION, SOLUTION INTRAVENOUS at 06:00

## 2024-05-03 RX ADMIN — SODIUM CHLORIDE: 9 INJECTION, SOLUTION INTRAVENOUS at 15:00

## 2024-05-03 RX ADMIN — ROSUVASTATIN CALCIUM 10 MG: 10 TABLET, COATED ORAL at 21:14

## 2024-05-03 RX ADMIN — HEPARIN SODIUM 18 UNITS/KG/HR: 10000 INJECTION, SOLUTION INTRAVENOUS at 10:47

## 2024-05-03 RX ADMIN — ACETAMINOPHEN 650 MG: 325 TABLET ORAL at 16:42

## 2024-05-03 RX ADMIN — PANTOPRAZOLE SODIUM 40 MG: 40 TABLET, DELAYED RELEASE ORAL at 10:31

## 2024-05-03 RX ADMIN — SODIUM CHLORIDE 1000 ML: 900 INJECTION, SOLUTION INTRAVENOUS at 04:11

## 2024-05-03 RX ADMIN — MORPHINE SULFATE 4 MG: 4 INJECTION, SOLUTION INTRAMUSCULAR; INTRAVENOUS at 16:47

## 2024-05-03 RX ADMIN — SODIUM CHLORIDE: 9 INJECTION, SOLUTION INTRAVENOUS at 21:14

## 2024-05-03 RX ADMIN — CEFTRIAXONE 1000 MG: 1 INJECTION, POWDER, FOR SOLUTION INTRAMUSCULAR; INTRAVENOUS at 03:04

## 2024-05-03 RX ADMIN — POLYVINYL ALCOHOL, POVIDONE 2 DROP: 14; 6 SOLUTION/ DROPS OPHTHALMIC at 22:13

## 2024-05-03 RX ADMIN — MORPHINE SULFATE 4 MG: 4 INJECTION, SOLUTION INTRAMUSCULAR; INTRAVENOUS at 01:54

## 2024-05-03 RX ADMIN — SODIUM CHLORIDE 1000 ML: 9 INJECTION, SOLUTION INTRAVENOUS at 00:49

## 2024-05-03 RX ADMIN — HEPARIN SODIUM 6900 UNITS: 1000 INJECTION INTRAVENOUS; SUBCUTANEOUS at 10:47

## 2024-05-03 ASSESSMENT — PAIN DESCRIPTION - LOCATION
LOCATION: HIP

## 2024-05-03 ASSESSMENT — PAIN SCALES - GENERAL
PAINLEVEL_OUTOF10: 2
PAINLEVEL_OUTOF10: 7
PAINLEVEL_OUTOF10: 6
PAINLEVEL_OUTOF10: 8
PAINLEVEL_OUTOF10: 6
PAINLEVEL_OUTOF10: 6
PAINLEVEL_OUTOF10: 8
PAINLEVEL_OUTOF10: 0
PAINLEVEL_OUTOF10: 0
PAINLEVEL_OUTOF10: 3

## 2024-05-03 ASSESSMENT — PAIN DESCRIPTION - ORIENTATION
ORIENTATION: RIGHT

## 2024-05-03 ASSESSMENT — LIFESTYLE VARIABLES
HOW OFTEN DO YOU HAVE A DRINK CONTAINING ALCOHOL: MONTHLY OR LESS
HOW MANY STANDARD DRINKS CONTAINING ALCOHOL DO YOU HAVE ON A TYPICAL DAY: 1 OR 2

## 2024-05-03 ASSESSMENT — PAIN DESCRIPTION - PAIN TYPE
TYPE: ACUTE PAIN

## 2024-05-03 ASSESSMENT — PAIN DESCRIPTION - DESCRIPTORS
DESCRIPTORS: ACHING

## 2024-05-03 ASSESSMENT — PAIN - FUNCTIONAL ASSESSMENT
PAIN_FUNCTIONAL_ASSESSMENT: 0-10
PAIN_FUNCTIONAL_ASSESSMENT: 0-10
PAIN_FUNCTIONAL_ASSESSMENT: ACTIVITIES ARE NOT PREVENTED
PAIN_FUNCTIONAL_ASSESSMENT: 0-10

## 2024-05-03 NOTE — ED PROVIDER NOTES
Stopped 5/3/24 0604)   magnesium sulfate 2000 mg in 50 mL IVPB premix (0 mg IntraVENous Stopped 5/3/24 0800)   zoledronic acid (ZOMETA) 4 mg/100 mL infusion (0 mg IntraVENous Stopped 5/3/24 1054)   heparin (porcine) injection 6,900 Units (6,900 Units IntraVENous Given 5/3/24 1047)       Please note that this dictation was completed with [employer], the computer voice recognition software.  Quite often unanticipated grammatical, syntax, homophones, and other interpretive errors are inadvertently transcribed by the computer software.  Please disregard these errors.  Please excuse any errors that have escaped final proofreading.       Vee Turner PA-C  05/04/24 4005

## 2024-05-03 NOTE — ED NOTES
Pt presents c/o weakness x 2 weeks, off an on, per pt. Pt was dx with UTI today, states that she couldn't get her medication due to feeling weak. Pt states that she has been unable to ambulate much today due to weakness.

## 2024-05-03 NOTE — CARE COORDINATION
05/03/24 1507   Service Assessment   Patient Orientation Alert and Oriented   Cognition Alert   History Provided By Patient   Primary Caregiver Self   Accompanied By/Relationship N/A   Support Systems Family Members;Friends/Neighbors   PCP Verified by CM Yes   Last Visit to PCP Within last 6 months   Prior Functional Level Independent in ADLs/IADLs   Current Functional Level Independent in ADLs/IADLs   Can patient return to prior living arrangement Yes   Ability to make needs known: Good   Family able to assist with home care needs: Yes   Would you like for me to discuss the discharge plan with any other family members/significant others, and if so, who? No   Financial Resources Medicare   Community Resources None   Social/Functional History   Lives With Alone   Type of Home House   Bathroom Toilet Standard   Home Equipment Cane   Receives Help From Family;Friend(s)   ADL Assistance Independent   Homemaking Assistance Independent   Ambulation Assistance Independent   Transfer Assistance Independent   Active  No   Occupation Retired   Discharge Planning   Type of Residence House   Living Arrangements Alone   Current Services Prior To Admission None   Potential Assistance Needed N/A   DME Ordered? No   Potential Assistance Purchasing Medications No   Type of Home Care Services None   Patient expects to be discharged to: House   History of falls? 0   Services At/After Discharge   Transition of Care Consult (CM Consult) Discharge Planning   Services At/After Discharge None    Resource Information Provided? No   Mode of Transport at Discharge Other (see comment)  (Family)   Confirm Follow Up Transport Self   Condition of Participation: Discharge Planning   The Plan for Transition of Care is related to the following treatment goals: The patient states she prefers to dc to her home when ready   The Patient and/or Patient Representative was provided with a Choice of Provider? Patient   The Patient and/Or

## 2024-05-03 NOTE — H&P
traZODone (DESYREL) 50 MG tablet Take by mouth    Automatic Reconciliation, Ar       Allergies   Allergen Reactions    Cephalexin Other (See Comments)     Yeast infections    Clotrimazole Other (See Comments)     Burning of vagina    Miconazole Nitrate Other (See Comments)     Burning of vagina         Review of Systems  GENERAL: Patient alert, awake and oriented times 3, able to communicate full sentences and not in distress.   HEENT: No change in vision, no earache, tinnitus, sore throat or sinus congestion.   NECK: No pain or stiffness.   PULMONARY: No shortness of breath, cough or wheeze.   Cardiovascular: no pnd or orthopnea, no CP  GASTROINTESTINAL: No abdominal pain, nausea, vomiting or diarrhea, melena or bright red blood per rectum.   GENITOURINARY: No urinary frequency, urgency, hesitancy or dysuria.   MUSCULOSKELETAL: No joint or muscle pain, no back pain, no recent trauma.   DERMATOLOGIC: No rash, no itching, no lesions.   ENDOCRINE: No polyuria, polydipsia, no heat or cold intolerance. No recent change in weight.   HEMATOLOGICAL: No anemia or easy bruising or bleeding.   NEUROLOGIC: No headache, seizures, numbness, tingling or weakness.       Physical Exam:     Physical Exam:  BP (!) 142/67   Pulse 95   Temp 98.8 °F (37.1 °C)   Resp 20   Ht 1.676 m (5' 6\")   Wt 85.7 kg (189 lb)   SpO2 98%   BMI 30.51 kg/m²         Temp (24hrs), Av.8 °F (37.1 °C), Min:98.8 °F (37.1 °C), Max:98.8 °F (37.1 °C)     190 -  0700  In: 1050   Out: -    No intake/output data recorded.    General:  Alert, cooperative, no distress, appears stated age.              Head: Normocephalic, without obvious abnormality, atraumatic.   Eyes:  Conjunctivae/corneas clear. PERRL, EOMs intact.   Nose: Nares normal. No drainage or sinus tenderness.   Neck: Supple, symmetrical, trachea midline, no adenopathy, thyroid: no enlargement, no carotid bruit and no JVD.   Lungs:   Clear to auscultation bilaterally.   Heart:

## 2024-05-03 NOTE — ED NOTES
Patient A/O with NAD noted; needs addressed; visitor at bedside; labs drawn; will continue to monitor

## 2024-05-03 NOTE — ED NOTES
Patient A/O with NAD noted; needs addressed; placed on monitor; EKG done; xr done; ua sent; IV start and labs sent; will assume care of patient

## 2024-05-04 LAB
ALBUMIN SERPL-MCNC: 2 G/DL (ref 3.4–5)
ALBUMIN/GLOB SERPL: 0.6 (ref 0.8–1.7)
ALP SERPL-CCNC: 96 U/L (ref 45–117)
ALT SERPL-CCNC: 9 U/L (ref 13–56)
ANION GAP SERPL CALC-SCNC: 7 MMOL/L (ref 3–18)
APTT PPP: 133 SEC (ref 23–36.4)
APTT PPP: 64.2 SEC (ref 23–36.4)
APTT PPP: 73.2 SEC (ref 23–36.4)
APTT PPP: 93.8 SEC (ref 23–36.4)
AST SERPL-CCNC: 8 U/L (ref 10–38)
BILIRUB SERPL-MCNC: 0.2 MG/DL (ref 0.2–1)
BUN SERPL-MCNC: 7 MG/DL (ref 7–18)
BUN/CREAT SERPL: 10 (ref 12–20)
CA-I SERPL-SCNC: 1.22 MMOL/L (ref 1.12–1.32)
CALCIUM SERPL-MCNC: 9.1 MG/DL (ref 8.5–10.1)
CHLORIDE SERPL-SCNC: 106 MMOL/L (ref 100–111)
CO2 SERPL-SCNC: 24 MMOL/L (ref 21–32)
CREAT SERPL-MCNC: 0.67 MG/DL (ref 0.6–1.3)
ERYTHROCYTE [DISTWIDTH] IN BLOOD BY AUTOMATED COUNT: 14.7 % (ref 11.6–14.5)
GLOBULIN SER CALC-MCNC: 3.6 G/DL (ref 2–4)
GLUCOSE SERPL-MCNC: 101 MG/DL (ref 74–99)
HCT VFR BLD AUTO: 28.1 % (ref 35–45)
HGB BLD-MCNC: 8.8 G/DL (ref 12–16)
MAGNESIUM SERPL-MCNC: 1.8 MG/DL (ref 1.6–2.6)
MCH RBC QN AUTO: 26 PG (ref 24–34)
MCHC RBC AUTO-ENTMCNC: 31.3 G/DL (ref 31–37)
MCV RBC AUTO: 82.9 FL (ref 78–100)
NRBC # BLD: 0 K/UL (ref 0–0.01)
NRBC BLD-RTO: 0 PER 100 WBC
PHOSPHATE SERPL-MCNC: 3.7 MG/DL (ref 2.5–4.9)
PLATELET # BLD AUTO: 317 K/UL (ref 135–420)
PMV BLD AUTO: 10.1 FL (ref 9.2–11.8)
POTASSIUM SERPL-SCNC: 3.7 MMOL/L (ref 3.5–5.5)
PROT SERPL-MCNC: 5.6 G/DL (ref 6.4–8.2)
RBC # BLD AUTO: 3.39 M/UL (ref 4.2–5.3)
SODIUM SERPL-SCNC: 137 MMOL/L (ref 136–145)
WBC # BLD AUTO: 6.3 K/UL (ref 4.6–13.2)

## 2024-05-04 PROCEDURE — 83735 ASSAY OF MAGNESIUM: CPT

## 2024-05-04 PROCEDURE — 6360000002 HC RX W HCPCS: Performed by: STUDENT IN AN ORGANIZED HEALTH CARE EDUCATION/TRAINING PROGRAM

## 2024-05-04 PROCEDURE — 6360000002 HC RX W HCPCS: Performed by: INTERNAL MEDICINE

## 2024-05-04 PROCEDURE — 85730 THROMBOPLASTIN TIME PARTIAL: CPT

## 2024-05-04 PROCEDURE — 6370000000 HC RX 637 (ALT 250 FOR IP): Performed by: INTERNAL MEDICINE

## 2024-05-04 PROCEDURE — 36415 COLL VENOUS BLD VENIPUNCTURE: CPT

## 2024-05-04 PROCEDURE — 6360000002 HC RX W HCPCS: Performed by: HOSPITALIST

## 2024-05-04 PROCEDURE — 85027 COMPLETE CBC AUTOMATED: CPT

## 2024-05-04 PROCEDURE — 82330 ASSAY OF CALCIUM: CPT

## 2024-05-04 PROCEDURE — 2580000003 HC RX 258: Performed by: INTERNAL MEDICINE

## 2024-05-04 PROCEDURE — 84100 ASSAY OF PHOSPHORUS: CPT

## 2024-05-04 PROCEDURE — 1100000003 HC PRIVATE W/ TELEMETRY

## 2024-05-04 PROCEDURE — 80053 COMPREHEN METABOLIC PANEL: CPT

## 2024-05-04 RX ORDER — HYDROCODONE POLISTIREX AND CHLORPHENIRAMINE POLISTIREX 10; 8 MG/5ML; MG/5ML
5 SUSPENSION, EXTENDED RELEASE ORAL EVERY 12 HOURS PRN
Status: DISCONTINUED | OUTPATIENT
Start: 2024-05-04 | End: 2024-05-13 | Stop reason: HOSPADM

## 2024-05-04 RX ORDER — DOCUSATE SODIUM 100 MG/1
100 CAPSULE, LIQUID FILLED ORAL 2 TIMES DAILY
Status: DISCONTINUED | OUTPATIENT
Start: 2024-05-04 | End: 2024-05-13 | Stop reason: HOSPADM

## 2024-05-04 RX ADMIN — ROSUVASTATIN CALCIUM 10 MG: 10 TABLET, COATED ORAL at 20:27

## 2024-05-04 RX ADMIN — Medication 1 LOZENGE: at 18:07

## 2024-05-04 RX ADMIN — SODIUM CHLORIDE, PRESERVATIVE FREE 10 ML: 5 INJECTION INTRAVENOUS at 20:28

## 2024-05-04 RX ADMIN — SODIUM CHLORIDE, PRESERVATIVE FREE 10 ML: 5 INJECTION INTRAVENOUS at 09:02

## 2024-05-04 RX ADMIN — MORPHINE SULFATE 4 MG: 4 INJECTION, SOLUTION INTRAMUSCULAR; INTRAVENOUS at 20:40

## 2024-05-04 RX ADMIN — SODIUM CHLORIDE, PRESERVATIVE FREE 10 ML: 5 INJECTION INTRAVENOUS at 09:31

## 2024-05-04 RX ADMIN — Medication 1 LOZENGE: at 13:00

## 2024-05-04 RX ADMIN — MORPHINE SULFATE 4 MG: 4 INJECTION, SOLUTION INTRAMUSCULAR; INTRAVENOUS at 02:21

## 2024-05-04 RX ADMIN — SODIUM CHLORIDE: 9 INJECTION, SOLUTION INTRAVENOUS at 05:00

## 2024-05-04 RX ADMIN — SODIUM CHLORIDE: 9 INJECTION, SOLUTION INTRAVENOUS at 20:35

## 2024-05-04 RX ADMIN — SODIUM CHLORIDE: 9 INJECTION, SOLUTION INTRAVENOUS at 12:47

## 2024-05-04 RX ADMIN — PANTOPRAZOLE SODIUM 40 MG: 40 TABLET, DELAYED RELEASE ORAL at 08:59

## 2024-05-04 RX ADMIN — HEPARIN SODIUM 15 UNITS/KG/HR: 10000 INJECTION, SOLUTION INTRAVENOUS at 03:26

## 2024-05-04 RX ADMIN — ACETAMINOPHEN 650 MG: 325 TABLET ORAL at 04:41

## 2024-05-04 RX ADMIN — THYROID 60 MG: 30 TABLET ORAL at 08:59

## 2024-05-04 RX ADMIN — ACETAMINOPHEN 650 MG: 325 TABLET ORAL at 11:28

## 2024-05-04 RX ADMIN — CEFTRIAXONE 1000 MG: 1 INJECTION, POWDER, FOR SOLUTION INTRAMUSCULAR; INTRAVENOUS at 02:18

## 2024-05-04 RX ADMIN — MORPHINE SULFATE 4 MG: 4 INJECTION, SOLUTION INTRAMUSCULAR; INTRAVENOUS at 12:47

## 2024-05-04 RX ADMIN — HEPARIN SODIUM 3400 UNITS: 1000 INJECTION INTRAVENOUS; SUBCUTANEOUS at 09:27

## 2024-05-04 RX ADMIN — Medication 1 LOZENGE: at 22:58

## 2024-05-04 RX ADMIN — DOCUSATE SODIUM 100 MG: 100 CAPSULE, LIQUID FILLED ORAL at 15:00

## 2024-05-04 RX ADMIN — DOCUSATE SODIUM 100 MG: 100 CAPSULE, LIQUID FILLED ORAL at 20:28

## 2024-05-04 ASSESSMENT — PAIN DESCRIPTION - ORIENTATION
ORIENTATION: RIGHT

## 2024-05-04 ASSESSMENT — PAIN SCALES - GENERAL
PAINLEVEL_OUTOF10: 3
PAINLEVEL_OUTOF10: 0
PAINLEVEL_OUTOF10: 8
PAINLEVEL_OUTOF10: 7
PAINLEVEL_OUTOF10: 4
PAINLEVEL_OUTOF10: 3
PAINLEVEL_OUTOF10: 0
PAINLEVEL_OUTOF10: 6
PAINLEVEL_OUTOF10: 0
PAINLEVEL_OUTOF10: 7

## 2024-05-04 ASSESSMENT — PAIN DESCRIPTION - PAIN TYPE: TYPE: ACUTE PAIN

## 2024-05-04 ASSESSMENT — PAIN DESCRIPTION - DESCRIPTORS
DESCRIPTORS: ACHING

## 2024-05-04 ASSESSMENT — PAIN DESCRIPTION - LOCATION
LOCATION: HIP

## 2024-05-04 ASSESSMENT — PAIN - FUNCTIONAL ASSESSMENT: PAIN_FUNCTIONAL_ASSESSMENT: ACTIVITIES ARE NOT PREVENTED

## 2024-05-05 ENCOUNTER — APPOINTMENT (OUTPATIENT)
Facility: HOSPITAL | Age: 69
DRG: 478 | End: 2024-05-05
Payer: MEDICARE

## 2024-05-05 LAB
ALBUMIN SERPL-MCNC: 1.8 G/DL (ref 3.4–5)
ALBUMIN/GLOB SERPL: 0.6 (ref 0.8–1.7)
ALP SERPL-CCNC: 86 U/L (ref 45–117)
ALT SERPL-CCNC: 11 U/L (ref 13–56)
ANION GAP SERPL CALC-SCNC: 7 MMOL/L (ref 3–18)
APTT PPP: 98.2 SEC (ref 23–36.4)
AST SERPL-CCNC: 13 U/L (ref 10–38)
BASOPHILS # BLD: 0 K/UL (ref 0–0.1)
BASOPHILS NFR BLD: 0 % (ref 0–2)
BILIRUB SERPL-MCNC: 0.2 MG/DL (ref 0.2–1)
BUN SERPL-MCNC: 7 MG/DL (ref 7–18)
BUN/CREAT SERPL: 11 (ref 12–20)
CA-I SERPL-SCNC: 1.14 MMOL/L (ref 1.12–1.32)
CALCIUM SERPL-MCNC: 7.7 MG/DL (ref 8.5–10.1)
CHLORIDE SERPL-SCNC: 109 MMOL/L (ref 100–111)
CO2 SERPL-SCNC: 23 MMOL/L (ref 21–32)
CREAT SERPL-MCNC: 0.62 MG/DL (ref 0.6–1.3)
DIFFERENTIAL METHOD BLD: ABNORMAL
EOSINOPHIL # BLD: 0.1 K/UL (ref 0–0.4)
EOSINOPHIL NFR BLD: 3 % (ref 0–5)
ERYTHROCYTE [DISTWIDTH] IN BLOOD BY AUTOMATED COUNT: 14.6 % (ref 11.6–14.5)
GLOBULIN SER CALC-MCNC: 2.9 G/DL (ref 2–4)
GLUCOSE SERPL-MCNC: 90 MG/DL (ref 74–99)
HCT VFR BLD AUTO: 26.1 % (ref 35–45)
HGB BLD-MCNC: 8 G/DL (ref 12–16)
IMM GRANULOCYTES # BLD AUTO: 0.1 K/UL (ref 0–0.04)
IMM GRANULOCYTES NFR BLD AUTO: 1 % (ref 0–0.5)
LYMPHOCYTES # BLD: 1.3 K/UL (ref 0.9–3.6)
LYMPHOCYTES NFR BLD: 27 % (ref 21–52)
MAGNESIUM SERPL-MCNC: 1.7 MG/DL (ref 1.6–2.6)
MCH RBC QN AUTO: 25.5 PG (ref 24–34)
MCHC RBC AUTO-ENTMCNC: 30.7 G/DL (ref 31–37)
MCV RBC AUTO: 83.1 FL (ref 78–100)
MONOCYTES # BLD: 0.5 K/UL (ref 0.05–1.2)
MONOCYTES NFR BLD: 9 % (ref 3–10)
NEUTS SEG # BLD: 2.9 K/UL (ref 1.8–8)
NEUTS SEG NFR BLD: 59 % (ref 40–73)
NRBC # BLD: 0 K/UL (ref 0–0.01)
NRBC BLD-RTO: 0 PER 100 WBC
PHOSPHATE SERPL-MCNC: 3 MG/DL (ref 2.5–4.9)
PLATELET # BLD AUTO: 290 K/UL (ref 135–420)
PMV BLD AUTO: 9.6 FL (ref 9.2–11.8)
POTASSIUM SERPL-SCNC: 3.4 MMOL/L (ref 3.5–5.5)
PROT SERPL-MCNC: 4.7 G/DL (ref 6.4–8.2)
RBC # BLD AUTO: 3.14 M/UL (ref 4.2–5.3)
SODIUM SERPL-SCNC: 139 MMOL/L (ref 136–145)
WBC # BLD AUTO: 4.9 K/UL (ref 4.6–13.2)

## 2024-05-05 PROCEDURE — 74177 CT ABD & PELVIS W/CONTRAST: CPT

## 2024-05-05 PROCEDURE — 82330 ASSAY OF CALCIUM: CPT

## 2024-05-05 PROCEDURE — 1100000003 HC PRIVATE W/ TELEMETRY

## 2024-05-05 PROCEDURE — 6360000002 HC RX W HCPCS: Performed by: HOSPITALIST

## 2024-05-05 PROCEDURE — 2700000000 HC OXYGEN THERAPY PER DAY

## 2024-05-05 PROCEDURE — 2580000003 HC RX 258: Performed by: INTERNAL MEDICINE

## 2024-05-05 PROCEDURE — 80053 COMPREHEN METABOLIC PANEL: CPT

## 2024-05-05 PROCEDURE — 6370000000 HC RX 637 (ALT 250 FOR IP): Performed by: INTERNAL MEDICINE

## 2024-05-05 PROCEDURE — 85730 THROMBOPLASTIN TIME PARTIAL: CPT

## 2024-05-05 PROCEDURE — 6360000002 HC RX W HCPCS: Performed by: INTERNAL MEDICINE

## 2024-05-05 PROCEDURE — 84100 ASSAY OF PHOSPHORUS: CPT

## 2024-05-05 PROCEDURE — 36415 COLL VENOUS BLD VENIPUNCTURE: CPT

## 2024-05-05 PROCEDURE — 6360000004 HC RX CONTRAST MEDICATION: Performed by: STUDENT IN AN ORGANIZED HEALTH CARE EDUCATION/TRAINING PROGRAM

## 2024-05-05 PROCEDURE — 83735 ASSAY OF MAGNESIUM: CPT

## 2024-05-05 PROCEDURE — 85025 COMPLETE CBC W/AUTO DIFF WBC: CPT

## 2024-05-05 PROCEDURE — 6360000002 HC RX W HCPCS: Performed by: STUDENT IN AN ORGANIZED HEALTH CARE EDUCATION/TRAINING PROGRAM

## 2024-05-05 RX ORDER — CALCIUM CARBONATE 500 MG/1
500 TABLET, CHEWABLE ORAL EVERY 12 HOURS
Status: DISCONTINUED | OUTPATIENT
Start: 2024-05-05 | End: 2024-05-13 | Stop reason: HOSPADM

## 2024-05-05 RX ADMIN — IOPAMIDOL 100 ML: 612 INJECTION, SOLUTION INTRAVENOUS at 08:18

## 2024-05-05 RX ADMIN — THYROID 60 MG: 30 TABLET ORAL at 08:34

## 2024-05-05 RX ADMIN — ROSUVASTATIN CALCIUM 10 MG: 10 TABLET, COATED ORAL at 20:17

## 2024-05-05 RX ADMIN — SODIUM CHLORIDE, PRESERVATIVE FREE 10 ML: 5 INJECTION INTRAVENOUS at 20:15

## 2024-05-05 RX ADMIN — HEPARIN SODIUM 15 UNITS/KG/HR: 10000 INJECTION, SOLUTION INTRAVENOUS at 21:55

## 2024-05-05 RX ADMIN — DOCUSATE SODIUM 100 MG: 100 CAPSULE, LIQUID FILLED ORAL at 08:34

## 2024-05-05 RX ADMIN — CALCIUM CARBONATE 500 MG: 500 TABLET, CHEWABLE ORAL at 15:41

## 2024-05-05 RX ADMIN — MORPHINE SULFATE 4 MG: 4 INJECTION, SOLUTION INTRAMUSCULAR; INTRAVENOUS at 15:41

## 2024-05-05 RX ADMIN — SODIUM CHLORIDE: 9 INJECTION, SOLUTION INTRAVENOUS at 04:58

## 2024-05-05 RX ADMIN — HYDROCODONE POLISTIREX AND CHLORPHENIRAMINE POLISTIREX 5 ML: 10; 8 SUSPENSION, EXTENDED RELEASE ORAL at 20:17

## 2024-05-05 RX ADMIN — MORPHINE SULFATE 4 MG: 4 INJECTION, SOLUTION INTRAMUSCULAR; INTRAVENOUS at 08:37

## 2024-05-05 RX ADMIN — MORPHINE SULFATE 4 MG: 4 INJECTION, SOLUTION INTRAMUSCULAR; INTRAVENOUS at 20:17

## 2024-05-05 RX ADMIN — CEFTRIAXONE 1000 MG: 1 INJECTION, POWDER, FOR SOLUTION INTRAMUSCULAR; INTRAVENOUS at 03:19

## 2024-05-05 RX ADMIN — DOCUSATE SODIUM 100 MG: 100 CAPSULE, LIQUID FILLED ORAL at 20:17

## 2024-05-05 RX ADMIN — PANTOPRAZOLE SODIUM 40 MG: 40 TABLET, DELAYED RELEASE ORAL at 08:34

## 2024-05-05 RX ADMIN — SODIUM CHLORIDE, PRESERVATIVE FREE 10 ML: 5 INJECTION INTRAVENOUS at 08:34

## 2024-05-05 RX ADMIN — HEPARIN SODIUM 15 UNITS/KG/HR: 10000 INJECTION, SOLUTION INTRAVENOUS at 00:44

## 2024-05-05 RX ADMIN — Medication 1 LOZENGE: at 15:41

## 2024-05-05 ASSESSMENT — PAIN DESCRIPTION - LOCATION
LOCATION: HIP

## 2024-05-05 ASSESSMENT — PAIN DESCRIPTION - DESCRIPTORS
DESCRIPTORS: ACHING

## 2024-05-05 ASSESSMENT — PAIN SCALES - GENERAL
PAINLEVEL_OUTOF10: 4
PAINLEVEL_OUTOF10: 2
PAINLEVEL_OUTOF10: 7
PAINLEVEL_OUTOF10: 7
PAINLEVEL_OUTOF10: 3
PAINLEVEL_OUTOF10: 7

## 2024-05-05 ASSESSMENT — PAIN SCALES - WONG BAKER
WONGBAKER_NUMERICALRESPONSE: NO HURT

## 2024-05-05 ASSESSMENT — PAIN DESCRIPTION - ORIENTATION
ORIENTATION: RIGHT

## 2024-05-06 ENCOUNTER — APPOINTMENT (OUTPATIENT)
Facility: HOSPITAL | Age: 69
DRG: 478 | End: 2024-05-06
Payer: MEDICARE

## 2024-05-06 PROBLEM — N28.89 RENAL MASS: Status: ACTIVE | Noted: 2024-05-06

## 2024-05-06 PROBLEM — I82.403 DEEP VEIN THROMBOSIS (DVT) OF BOTH LOWER EXTREMITIES (HCC): Status: ACTIVE | Noted: 2024-05-06

## 2024-05-06 LAB
APTT PPP: 64.6 SEC (ref 23–36.4)
APTT PPP: 82.8 SEC (ref 23–36.4)
BACTERIA SPEC CULT: ABNORMAL
BACTERIA SPEC CULT: ABNORMAL
BASOPHILS # BLD: 0 K/UL (ref 0–0.1)
BASOPHILS NFR BLD: 0 % (ref 0–2)
CC UR VC: ABNORMAL
DIFFERENTIAL METHOD BLD: ABNORMAL
EOSINOPHIL # BLD: 0.2 K/UL (ref 0–0.4)
EOSINOPHIL NFR BLD: 3 % (ref 0–5)
ERYTHROCYTE [DISTWIDTH] IN BLOOD BY AUTOMATED COUNT: 14.7 % (ref 11.6–14.5)
HCT VFR BLD AUTO: 25.8 % (ref 35–45)
HGB BLD-MCNC: 8 G/DL (ref 12–16)
IMM GRANULOCYTES # BLD AUTO: 0.1 K/UL (ref 0–0.04)
IMM GRANULOCYTES NFR BLD AUTO: 2 % (ref 0–0.5)
LYMPHOCYTES # BLD: 1.4 K/UL (ref 0.9–3.6)
LYMPHOCYTES NFR BLD: 23 % (ref 21–52)
MCH RBC QN AUTO: 26.2 PG (ref 24–34)
MCHC RBC AUTO-ENTMCNC: 31 G/DL (ref 31–37)
MCV RBC AUTO: 84.6 FL (ref 78–100)
MONOCYTES # BLD: 0.8 K/UL (ref 0.05–1.2)
MONOCYTES NFR BLD: 13 % (ref 3–10)
NEUTS SEG # BLD: 3.4 K/UL (ref 1.8–8)
NEUTS SEG NFR BLD: 59 % (ref 40–73)
NRBC # BLD: 0 K/UL (ref 0–0.01)
NRBC BLD-RTO: 0 PER 100 WBC
PLATELET # BLD AUTO: 292 K/UL (ref 135–420)
PMV BLD AUTO: 10 FL (ref 9.2–11.8)
RBC # BLD AUTO: 3.05 M/UL (ref 4.2–5.3)
SERVICE CMNT-IMP: ABNORMAL
WBC # BLD AUTO: 5.8 K/UL (ref 4.6–13.2)

## 2024-05-06 PROCEDURE — 6360000002 HC RX W HCPCS: Performed by: STUDENT IN AN ORGANIZED HEALTH CARE EDUCATION/TRAINING PROGRAM

## 2024-05-06 PROCEDURE — 36415 COLL VENOUS BLD VENIPUNCTURE: CPT

## 2024-05-06 PROCEDURE — 0QB13ZX EXCISION OF SACRUM, PERCUTANEOUS APPROACH, DIAGNOSTIC: ICD-10-PCS

## 2024-05-06 PROCEDURE — 85025 COMPLETE CBC W/AUTO DIFF WBC: CPT

## 2024-05-06 PROCEDURE — 6360000002 HC RX W HCPCS: Performed by: INTERNAL MEDICINE

## 2024-05-06 PROCEDURE — 2709999900 CT BIOPSY SUPERFICIAL BONE PERCUTANEOUS

## 2024-05-06 PROCEDURE — 6370000000 HC RX 637 (ALT 250 FOR IP): Performed by: INTERNAL MEDICINE

## 2024-05-06 PROCEDURE — 88333 PATH CONSLTJ SURG CYTO XM 1: CPT

## 2024-05-06 PROCEDURE — 88305 TISSUE EXAM BY PATHOLOGIST: CPT

## 2024-05-06 PROCEDURE — 2500000003 HC RX 250 WO HCPCS

## 2024-05-06 PROCEDURE — 6360000002 HC RX W HCPCS: Performed by: HOSPITALIST

## 2024-05-06 PROCEDURE — 99153 MOD SED SAME PHYS/QHP EA: CPT

## 2024-05-06 PROCEDURE — 1100000003 HC PRIVATE W/ TELEMETRY

## 2024-05-06 PROCEDURE — 88342 IMHCHEM/IMCYTCHM 1ST ANTB: CPT

## 2024-05-06 PROCEDURE — 99152 MOD SED SAME PHYS/QHP 5/>YRS: CPT

## 2024-05-06 PROCEDURE — 6360000002 HC RX W HCPCS

## 2024-05-06 PROCEDURE — 97166 OT EVAL MOD COMPLEX 45 MIN: CPT

## 2024-05-06 PROCEDURE — 97162 PT EVAL MOD COMPLEX 30 MIN: CPT

## 2024-05-06 PROCEDURE — 2580000003 HC RX 258: Performed by: INTERNAL MEDICINE

## 2024-05-06 PROCEDURE — 88341 IMHCHEM/IMCYTCHM EA ADD ANTB: CPT

## 2024-05-06 PROCEDURE — 85730 THROMBOPLASTIN TIME PARTIAL: CPT

## 2024-05-06 RX ORDER — LEVOFLOXACIN 5 MG/ML
500 INJECTION, SOLUTION INTRAVENOUS EVERY 24 HOURS
Status: DISCONTINUED | OUTPATIENT
Start: 2024-05-06 | End: 2024-05-07

## 2024-05-06 RX ORDER — FENTANYL CITRATE 50 UG/ML
INJECTION, SOLUTION INTRAMUSCULAR; INTRAVENOUS PRN
Status: COMPLETED | OUTPATIENT
Start: 2024-05-06 | End: 2024-05-06

## 2024-05-06 RX ORDER — MIDAZOLAM HYDROCHLORIDE 2 MG/2ML
INJECTION, SOLUTION INTRAMUSCULAR; INTRAVENOUS PRN
Status: COMPLETED | OUTPATIENT
Start: 2024-05-06 | End: 2024-05-06

## 2024-05-06 RX ORDER — LIDOCAINE HYDROCHLORIDE 10 MG/ML
INJECTION, SOLUTION EPIDURAL; INFILTRATION; INTRACAUDAL; PERINEURAL PRN
Status: COMPLETED | OUTPATIENT
Start: 2024-05-06 | End: 2024-05-06

## 2024-05-06 RX ADMIN — MORPHINE SULFATE 4 MG: 4 INJECTION, SOLUTION INTRAMUSCULAR; INTRAVENOUS at 18:44

## 2024-05-06 RX ADMIN — MORPHINE SULFATE 4 MG: 4 INJECTION, SOLUTION INTRAMUSCULAR; INTRAVENOUS at 08:17

## 2024-05-06 RX ADMIN — CALCIUM CARBONATE 500 MG: 500 TABLET, CHEWABLE ORAL at 15:04

## 2024-05-06 RX ADMIN — MIDAZOLAM HYDROCHLORIDE 1 MG: 1 INJECTION, SOLUTION INTRAMUSCULAR; INTRAVENOUS at 13:20

## 2024-05-06 RX ADMIN — CEFTRIAXONE 1000 MG: 1 INJECTION, POWDER, FOR SOLUTION INTRAMUSCULAR; INTRAVENOUS at 02:41

## 2024-05-06 RX ADMIN — FENTANYL CITRATE 25 MCG: 50 INJECTION, SOLUTION INTRAMUSCULAR; INTRAVENOUS at 13:20

## 2024-05-06 RX ADMIN — ROSUVASTATIN CALCIUM 10 MG: 10 TABLET, COATED ORAL at 21:49

## 2024-05-06 RX ADMIN — SODIUM CHLORIDE, PRESERVATIVE FREE 10 ML: 5 INJECTION INTRAVENOUS at 21:49

## 2024-05-06 RX ADMIN — LEVOFLOXACIN 500 MG: 5 INJECTION, SOLUTION INTRAVENOUS at 15:04

## 2024-05-06 RX ADMIN — MIDAZOLAM HYDROCHLORIDE 1 MG: 1 INJECTION, SOLUTION INTRAMUSCULAR; INTRAVENOUS at 13:16

## 2024-05-06 RX ADMIN — MORPHINE SULFATE 4 MG: 4 INJECTION, SOLUTION INTRAMUSCULAR; INTRAVENOUS at 02:43

## 2024-05-06 RX ADMIN — LIDOCAINE HYDROCHLORIDE 10 ML: 10 INJECTION, SOLUTION EPIDURAL; INFILTRATION; INTRACAUDAL; PERINEURAL at 13:25

## 2024-05-06 RX ADMIN — Medication 1 LOZENGE: at 10:52

## 2024-05-06 RX ADMIN — OXYCODONE HYDROCHLORIDE AND ACETAMINOPHEN 1 TABLET: 5; 325 TABLET ORAL at 22:02

## 2024-05-06 RX ADMIN — FENTANYL CITRATE 50 MCG: 50 INJECTION, SOLUTION INTRAMUSCULAR; INTRAVENOUS at 13:16

## 2024-05-06 RX ADMIN — THYROID 60 MG: 30 TABLET ORAL at 08:17

## 2024-05-06 RX ADMIN — DOCUSATE SODIUM 100 MG: 100 CAPSULE, LIQUID FILLED ORAL at 21:48

## 2024-05-06 RX ADMIN — CALCIUM CARBONATE 500 MG: 500 TABLET, CHEWABLE ORAL at 02:41

## 2024-05-06 RX ADMIN — FENTANYL CITRATE 25 MCG: 50 INJECTION, SOLUTION INTRAMUSCULAR; INTRAVENOUS at 13:29

## 2024-05-06 RX ADMIN — SODIUM CHLORIDE, PRESERVATIVE FREE 10 ML: 5 INJECTION INTRAVENOUS at 08:17

## 2024-05-06 ASSESSMENT — PAIN SCALES - GENERAL
PAINLEVEL_OUTOF10: 7
PAINLEVEL_OUTOF10: 6
PAINLEVEL_OUTOF10: 0
PAINLEVEL_OUTOF10: 4
PAINLEVEL_OUTOF10: 5
PAINLEVEL_OUTOF10: 6

## 2024-05-06 ASSESSMENT — PAIN DESCRIPTION - FREQUENCY: FREQUENCY: CONTINUOUS

## 2024-05-06 ASSESSMENT — PAIN DESCRIPTION - ONSET: ONSET: ON-GOING

## 2024-05-06 ASSESSMENT — PAIN DESCRIPTION - DESCRIPTORS
DESCRIPTORS: SHARP;SORE
DESCRIPTORS: ACHING
DESCRIPTORS: ACHING

## 2024-05-06 ASSESSMENT — PAIN SCALES - WONG BAKER
WONGBAKER_NUMERICALRESPONSE: NO HURT
WONGBAKER_NUMERICALRESPONSE: NO HURT

## 2024-05-06 ASSESSMENT — PAIN DESCRIPTION - ORIENTATION
ORIENTATION: RIGHT

## 2024-05-06 ASSESSMENT — PAIN DESCRIPTION - PAIN TYPE: TYPE: ACUTE PAIN

## 2024-05-06 ASSESSMENT — PAIN DESCRIPTION - LOCATION
LOCATION: BACK;HIP
LOCATION: HIP
LOCATION: HIP

## 2024-05-06 ASSESSMENT — PAIN - FUNCTIONAL ASSESSMENT: PAIN_FUNCTIONAL_ASSESSMENT: PREVENTS OR INTERFERES SOME ACTIVE ACTIVITIES AND ADLS

## 2024-05-06 NOTE — OR NURSING
Pt educated on procedure and sedation. Verbalized understanding. Pt confirmed NPO status, nothing to eat or drink since 0000. Pt had heparin paused at 0800. Pt confirmed no issues with sedation in the past.

## 2024-05-06 NOTE — OR NURSING
RESTART HEPARIN @ 4PM     TRANSFER - OUT REPORT:    Verbal report given to Abdi DAILEY on Madina Schafer  being transferred to  for routine progression of patient care       Report consisted of patient's Situation, Background, Assessment and   Recommendations(SBAR).     Information from the following report(s) Nurse Handoff Report, MAR, and Event Log was reviewed with the receiving nurse.           Lines:   Peripheral IV 05/03/24 Left Antecubital (Active)   Site Assessment Clean, dry & intact 05/06/24 0809   Line Status Capped;Flushed;Normal saline locked 05/06/24 0809   Line Care Connections checked and tightened 05/06/24 0809   Phlebitis Assessment No symptoms 05/06/24 0809   Infiltration Assessment 0 05/06/24 0809   Alcohol Cap Used Yes 05/06/24 0809   Dressing Status Clean, dry & intact 05/06/24 0809   Dressing Type Transparent 05/06/24 0809       Peripheral IV 05/03/24 Right Forearm (Active)   Site Assessment Clean, dry & intact 05/06/24 0809   Line Status Flushed;Normal saline locked 05/06/24 0809   Line Care Connections checked and tightened 05/06/24 0809   Phlebitis Assessment No symptoms 05/06/24 0809   Infiltration Assessment 0 05/06/24 0809   Alcohol Cap Used Yes 05/06/24 0809   Dressing Status Clean, dry & intact 05/06/24 0809   Dressing Type Transparent 05/06/24 0809   Dressing Intervention New 05/03/24 0100        Opportunity for questions and clarification was provided.      Patient transported with:  Tech

## 2024-05-06 NOTE — PRE SEDATION
INTERVENTIONAL RADIOLOGY  Preoperative History and Physical      Patient:  Madina Schafer  :  1955  Age:  69 y.o.  MRN:  [id number]  Today's Date:  2024      CC / HPI   Madina Schafer is a 69 y.o. female with a history of bony lesions, lung nodules, renal mass, adrenal mass who presents for CT Guided Biopsy of Right Sacral Lesion with Moderate Sedation.    Consent: I have discussed with the patient and/or the patient representative the indication, alternatives, and the possible risks and/or complications of the planned procedure and the anesthesia methods. The patient and/or patient representative appear to understand and agree to proceed.    PAST MEDICAL HISTORY  Past Medical History:   Diagnosis Date    Arthritis     hands    Chronic pain     right SI joint    GERD (gastroesophageal reflux disease)     Hypertension     Other ill-defined conditions(799.89)     high cholestrol    Thyroid disease        PAST SURGICAL HISTORY  Past Surgical History:   Procedure Laterality Date    BIOPSY OF BREAST, INCISIONAL Right     fibroadenoma    GI      colonoscopy    HYSTERECTOMY (CERVIX STATUS UNKNOWN)      TVH    OVARY REMOVAL Bilateral     SHOULDER ARTHROSCOPY Left 2011    SHOULDER ARTHROSCOPY Right     TONSILLECTOMY  1976    UROLOGICAL SURGERY      cystoscopy       CURRENT MEDICATIONS  Current Facility-Administered Medications   Medication Dose Route Frequency Provider Last Rate Last Admin    levoFLOXacin (LEVAQUIN) 500 MG/100ML infusion 500 mg  500 mg IntraVENous Q24H April Schwartz MD        calcium carbonate (TUMS) chewable tablet 500 mg  500 mg Oral Q12H Ave Villalpando MD   500 mg at 24 0241    benzocaine-menthol (CEPACOL SORE THROAT) lozenge 1 lozenge  1 lozenge Oral 4x Daily PRN Ave Villalpando MD   1 lozenge at 24 1052    docusate sodium (COLACE) capsule 100 mg  100 mg Oral BID Ave Villalpando MD   100 mg at 24    HYDROcodone-chlorpheniramine

## 2024-05-06 NOTE — PROCEDURES
Interventional Radiology Brief Postoperative Note    Procedure Date:  5/6/2024    Procedure:  CT Guided Biopsy of Right Sacral Lesion     :  Filomena Mcclain NP    Attending:  Hoa Turner MD    Preoperative Diagnosis:  Multiple bony lesions    Postoperative Diagnosis:  Multiple bony lesions    Complications:  None immediate.    Estimated Blood Loss:  < 5 mL    Procedure Findings:  Technically successful CT guided right sacral lesion biopsy with moderate sedation.  Pathology results are pending.    Specimens:  4 18-gauge core biopsy samples were sent for pathology.    Condition:  The patient tolerated the procedure without difficulty and remained in stable condition throughout.    Okay to restart heparin gtt 2-4 hours post procedure, per primary team.    Filomena Mcclain, APRN - CNP  [facility]

## 2024-05-07 ENCOUNTER — APPOINTMENT (OUTPATIENT)
Facility: HOSPITAL | Age: 69
DRG: 478 | End: 2024-05-07
Payer: MEDICARE

## 2024-05-07 LAB
ALBUMIN SERPL-MCNC: 1.8 G/DL (ref 3.4–5)
ALBUMIN/GLOB SERPL: 0.5 (ref 0.8–1.7)
ALP SERPL-CCNC: 121 U/L (ref 45–117)
ALT SERPL-CCNC: 27 U/L (ref 13–56)
ANION GAP SERPL CALC-SCNC: 9 MMOL/L (ref 3–18)
APTT PPP: 96.2 SEC (ref 23–36.4)
AST SERPL-CCNC: 55 U/L (ref 10–38)
BILIRUB SERPL-MCNC: 0.3 MG/DL (ref 0.2–1)
BUN SERPL-MCNC: 7 MG/DL (ref 7–18)
BUN/CREAT SERPL: 11 (ref 12–20)
CALCIUM SERPL-MCNC: 8.2 MG/DL (ref 8.5–10.1)
CHLORIDE SERPL-SCNC: 106 MMOL/L (ref 100–111)
CO2 SERPL-SCNC: 26 MMOL/L (ref 21–32)
CREAT SERPL-MCNC: 0.63 MG/DL (ref 0.6–1.3)
ERYTHROCYTE [DISTWIDTH] IN BLOOD BY AUTOMATED COUNT: 14.6 % (ref 11.6–14.5)
GLOBULIN SER CALC-MCNC: 3.4 G/DL (ref 2–4)
GLUCOSE SERPL-MCNC: 81 MG/DL (ref 74–99)
HCT VFR BLD AUTO: 27.6 % (ref 35–45)
HGB BLD-MCNC: 8.5 G/DL (ref 12–16)
MCH RBC QN AUTO: 26 PG (ref 24–34)
MCHC RBC AUTO-ENTMCNC: 30.8 G/DL (ref 31–37)
MCV RBC AUTO: 84.4 FL (ref 78–100)
NRBC # BLD: 0 K/UL (ref 0–0.01)
NRBC BLD-RTO: 0 PER 100 WBC
PLATELET # BLD AUTO: 314 K/UL (ref 135–420)
PMV BLD AUTO: 10.7 FL (ref 9.2–11.8)
POTASSIUM SERPL-SCNC: 3.5 MMOL/L (ref 3.5–5.5)
PROT SERPL-MCNC: 5.2 G/DL (ref 6.4–8.2)
RBC # BLD AUTO: 3.27 M/UL (ref 4.2–5.3)
SODIUM SERPL-SCNC: 141 MMOL/L (ref 136–145)
WBC # BLD AUTO: 6.6 K/UL (ref 4.6–13.2)

## 2024-05-07 PROCEDURE — 6370000000 HC RX 637 (ALT 250 FOR IP): Performed by: INTERNAL MEDICINE

## 2024-05-07 PROCEDURE — 2580000003 HC RX 258: Performed by: INTERNAL MEDICINE

## 2024-05-07 PROCEDURE — 6360000002 HC RX W HCPCS: Performed by: STUDENT IN AN ORGANIZED HEALTH CARE EDUCATION/TRAINING PROGRAM

## 2024-05-07 PROCEDURE — 71045 X-RAY EXAM CHEST 1 VIEW: CPT

## 2024-05-07 PROCEDURE — 85027 COMPLETE CBC AUTOMATED: CPT

## 2024-05-07 PROCEDURE — 6360000002 HC RX W HCPCS: Performed by: HOSPITALIST

## 2024-05-07 PROCEDURE — 97530 THERAPEUTIC ACTIVITIES: CPT

## 2024-05-07 PROCEDURE — 80053 COMPREHEN METABOLIC PANEL: CPT

## 2024-05-07 PROCEDURE — 2700000000 HC OXYGEN THERAPY PER DAY

## 2024-05-07 PROCEDURE — 36415 COLL VENOUS BLD VENIPUNCTURE: CPT

## 2024-05-07 PROCEDURE — 1100000003 HC PRIVATE W/ TELEMETRY

## 2024-05-07 PROCEDURE — 6370000000 HC RX 637 (ALT 250 FOR IP): Performed by: HOSPITALIST

## 2024-05-07 PROCEDURE — 85730 THROMBOPLASTIN TIME PARTIAL: CPT

## 2024-05-07 RX ORDER — LEVOFLOXACIN 5 MG/ML
500 INJECTION, SOLUTION INTRAVENOUS EVERY 24 HOURS
Status: COMPLETED | OUTPATIENT
Start: 2024-05-07 | End: 2024-05-10

## 2024-05-07 RX ORDER — FUROSEMIDE 10 MG/ML
20 INJECTION INTRAMUSCULAR; INTRAVENOUS DAILY
Status: COMPLETED | OUTPATIENT
Start: 2024-05-07 | End: 2024-05-10

## 2024-05-07 RX ADMIN — LEVOFLOXACIN 500 MG: 5 INJECTION, SOLUTION INTRAVENOUS at 13:26

## 2024-05-07 RX ADMIN — HEPARIN SODIUM 3400 UNITS: 1000 INJECTION INTRAVENOUS; SUBCUTANEOUS at 00:05

## 2024-05-07 RX ADMIN — PANTOPRAZOLE SODIUM 40 MG: 40 TABLET, DELAYED RELEASE ORAL at 08:33

## 2024-05-07 RX ADMIN — CALCIUM CARBONATE 500 MG: 500 TABLET, CHEWABLE ORAL at 15:08

## 2024-05-07 RX ADMIN — RIVAROXABAN 20 MG: 10 TABLET, FILM COATED ORAL at 11:03

## 2024-05-07 RX ADMIN — FUROSEMIDE 20 MG: 10 INJECTION, SOLUTION INTRAMUSCULAR; INTRAVENOUS at 13:26

## 2024-05-07 RX ADMIN — ONDANSETRON 4 MG: 2 INJECTION INTRAMUSCULAR; INTRAVENOUS at 06:56

## 2024-05-07 RX ADMIN — HEPARIN SODIUM 17 UNITS/KG/HR: 10000 INJECTION, SOLUTION INTRAVENOUS at 01:17

## 2024-05-07 RX ADMIN — ROSUVASTATIN CALCIUM 10 MG: 10 TABLET, COATED ORAL at 20:17

## 2024-05-07 RX ADMIN — THYROID 60 MG: 30 TABLET ORAL at 08:33

## 2024-05-07 RX ADMIN — DOCUSATE SODIUM 100 MG: 100 CAPSULE, LIQUID FILLED ORAL at 08:33

## 2024-05-07 RX ADMIN — CALCIUM CARBONATE 500 MG: 500 TABLET, CHEWABLE ORAL at 04:54

## 2024-05-07 RX ADMIN — SODIUM CHLORIDE, PRESERVATIVE FREE 10 ML: 5 INJECTION INTRAVENOUS at 08:33

## 2024-05-07 RX ADMIN — MORPHINE SULFATE 4 MG: 4 INJECTION, SOLUTION INTRAMUSCULAR; INTRAVENOUS at 19:20

## 2024-05-07 RX ADMIN — ONDANSETRON 4 MG: 2 INJECTION INTRAMUSCULAR; INTRAVENOUS at 22:51

## 2024-05-07 RX ADMIN — DOCUSATE SODIUM 100 MG: 100 CAPSULE, LIQUID FILLED ORAL at 20:18

## 2024-05-07 RX ADMIN — MORPHINE SULFATE 4 MG: 4 INJECTION, SOLUTION INTRAMUSCULAR; INTRAVENOUS at 07:17

## 2024-05-07 RX ADMIN — OXYCODONE HYDROCHLORIDE AND ACETAMINOPHEN 1 TABLET: 5; 325 TABLET ORAL at 22:51

## 2024-05-07 RX ADMIN — ONDANSETRON 4 MG: 2 INJECTION INTRAMUSCULAR; INTRAVENOUS at 17:28

## 2024-05-07 ASSESSMENT — PAIN - FUNCTIONAL ASSESSMENT
PAIN_FUNCTIONAL_ASSESSMENT: PREVENTS OR INTERFERES SOME ACTIVE ACTIVITIES AND ADLS

## 2024-05-07 ASSESSMENT — PAIN DESCRIPTION - ORIENTATION
ORIENTATION: RIGHT
ORIENTATION: RIGHT
ORIENTATION: LEFT

## 2024-05-07 ASSESSMENT — PAIN SCALES - GENERAL
PAINLEVEL_OUTOF10: 0
PAINLEVEL_OUTOF10: 8
PAINLEVEL_OUTOF10: 9
PAINLEVEL_OUTOF10: 6

## 2024-05-07 ASSESSMENT — PAIN DESCRIPTION - LOCATION
LOCATION: HIP

## 2024-05-07 ASSESSMENT — PAIN DESCRIPTION - DESCRIPTORS
DESCRIPTORS: ACHING;DISCOMFORT;SORE
DESCRIPTORS: ACHING;DISCOMFORT;NAGGING
DESCRIPTORS: ACHING;DISCOMFORT

## 2024-05-08 PROBLEM — J90 PLEURAL EFFUSION: Status: ACTIVE | Noted: 2024-05-08

## 2024-05-08 LAB
ALBUMIN SERPL-MCNC: 1.9 G/DL (ref 3.4–5)
ALBUMIN/GLOB SERPL: 0.5 (ref 0.8–1.7)
ALP SERPL-CCNC: 128 U/L (ref 45–117)
ALT SERPL-CCNC: 27 U/L (ref 13–56)
ANION GAP SERPL CALC-SCNC: 6 MMOL/L (ref 3–18)
AST SERPL-CCNC: 43 U/L (ref 10–38)
BILIRUB SERPL-MCNC: 0.2 MG/DL (ref 0.2–1)
BUN SERPL-MCNC: 6 MG/DL (ref 7–18)
BUN/CREAT SERPL: 8 (ref 12–20)
CALCIUM SERPL-MCNC: 8.3 MG/DL (ref 8.5–10.1)
CHLORIDE SERPL-SCNC: 106 MMOL/L (ref 100–111)
CO2 SERPL-SCNC: 29 MMOL/L (ref 21–32)
CREAT SERPL-MCNC: 0.77 MG/DL (ref 0.6–1.3)
GLOBULIN SER CALC-MCNC: 3.5 G/DL (ref 2–4)
GLUCOSE SERPL-MCNC: 93 MG/DL (ref 74–99)
POTASSIUM SERPL-SCNC: 3.7 MMOL/L (ref 3.5–5.5)
PROT SERPL-MCNC: 5.4 G/DL (ref 6.4–8.2)
SODIUM SERPL-SCNC: 141 MMOL/L (ref 136–145)

## 2024-05-08 PROCEDURE — 36415 COLL VENOUS BLD VENIPUNCTURE: CPT

## 2024-05-08 PROCEDURE — 80053 COMPREHEN METABOLIC PANEL: CPT

## 2024-05-08 PROCEDURE — 97535 SELF CARE MNGMENT TRAINING: CPT

## 2024-05-08 PROCEDURE — 6360000002 HC RX W HCPCS: Performed by: STUDENT IN AN ORGANIZED HEALTH CARE EDUCATION/TRAINING PROGRAM

## 2024-05-08 PROCEDURE — 6370000000 HC RX 637 (ALT 250 FOR IP): Performed by: INTERNAL MEDICINE

## 2024-05-08 PROCEDURE — 2700000000 HC OXYGEN THERAPY PER DAY

## 2024-05-08 PROCEDURE — 97116 GAIT TRAINING THERAPY: CPT

## 2024-05-08 PROCEDURE — 2580000003 HC RX 258: Performed by: INTERNAL MEDICINE

## 2024-05-08 PROCEDURE — 6370000000 HC RX 637 (ALT 250 FOR IP): Performed by: HOSPITALIST

## 2024-05-08 PROCEDURE — 1100000003 HC PRIVATE W/ TELEMETRY

## 2024-05-08 PROCEDURE — 97530 THERAPEUTIC ACTIVITIES: CPT

## 2024-05-08 PROCEDURE — 6360000002 HC RX W HCPCS: Performed by: HOSPITALIST

## 2024-05-08 RX ADMIN — SODIUM CHLORIDE, PRESERVATIVE FREE 10 ML: 5 INJECTION INTRAVENOUS at 20:46

## 2024-05-08 RX ADMIN — LEVOFLOXACIN 500 MG: 5 INJECTION, SOLUTION INTRAVENOUS at 12:56

## 2024-05-08 RX ADMIN — SODIUM CHLORIDE, PRESERVATIVE FREE 10 ML: 5 INJECTION INTRAVENOUS at 08:22

## 2024-05-08 RX ADMIN — MORPHINE SULFATE 4 MG: 4 INJECTION, SOLUTION INTRAMUSCULAR; INTRAVENOUS at 13:03

## 2024-05-08 RX ADMIN — ROSUVASTATIN CALCIUM 10 MG: 10 TABLET, COATED ORAL at 20:44

## 2024-05-08 RX ADMIN — CALCIUM CARBONATE 500 MG: 500 TABLET, CHEWABLE ORAL at 14:41

## 2024-05-08 RX ADMIN — ONDANSETRON 4 MG: 2 INJECTION INTRAMUSCULAR; INTRAVENOUS at 08:50

## 2024-05-08 RX ADMIN — PANTOPRAZOLE SODIUM 40 MG: 40 TABLET, DELAYED RELEASE ORAL at 08:20

## 2024-05-08 RX ADMIN — DOCUSATE SODIUM 100 MG: 100 CAPSULE, LIQUID FILLED ORAL at 08:20

## 2024-05-08 RX ADMIN — DOCUSATE SODIUM 100 MG: 100 CAPSULE, LIQUID FILLED ORAL at 20:44

## 2024-05-08 RX ADMIN — FUROSEMIDE 20 MG: 10 INJECTION, SOLUTION INTRAMUSCULAR; INTRAVENOUS at 08:21

## 2024-05-08 RX ADMIN — RIVAROXABAN 20 MG: 10 TABLET, FILM COATED ORAL at 18:29

## 2024-05-08 RX ADMIN — MORPHINE SULFATE 4 MG: 4 INJECTION, SOLUTION INTRAMUSCULAR; INTRAVENOUS at 08:21

## 2024-05-08 RX ADMIN — CALCIUM CARBONATE 500 MG: 500 TABLET, CHEWABLE ORAL at 04:51

## 2024-05-08 RX ADMIN — THYROID 60 MG: 30 TABLET ORAL at 08:20

## 2024-05-08 RX ADMIN — MORPHINE SULFATE 4 MG: 4 INJECTION, SOLUTION INTRAMUSCULAR; INTRAVENOUS at 23:45

## 2024-05-08 RX ADMIN — MORPHINE SULFATE 4 MG: 4 INJECTION, SOLUTION INTRAMUSCULAR; INTRAVENOUS at 18:30

## 2024-05-08 ASSESSMENT — PAIN - FUNCTIONAL ASSESSMENT: PAIN_FUNCTIONAL_ASSESSMENT: ACTIVITIES ARE NOT PREVENTED

## 2024-05-08 ASSESSMENT — PAIN DESCRIPTION - DESCRIPTORS
DESCRIPTORS: JABBING
DESCRIPTORS: ACHING;SQUEEZING

## 2024-05-08 ASSESSMENT — PAIN SCALES - GENERAL
PAINLEVEL_OUTOF10: 7
PAINLEVEL_OUTOF10: 6
PAINLEVEL_OUTOF10: 0
PAINLEVEL_OUTOF10: 7

## 2024-05-08 ASSESSMENT — PAIN DESCRIPTION - LOCATION
LOCATION: HIP
LOCATION: HIP

## 2024-05-08 ASSESSMENT — PAIN DESCRIPTION - ORIENTATION
ORIENTATION: RIGHT
ORIENTATION: RIGHT

## 2024-05-09 ENCOUNTER — APPOINTMENT (OUTPATIENT)
Facility: HOSPITAL | Age: 69
DRG: 478 | End: 2024-05-09
Payer: MEDICARE

## 2024-05-09 LAB
ALBUMIN SERPL-MCNC: 2 G/DL (ref 3.4–5)
ALBUMIN/GLOB SERPL: 0.6 (ref 0.8–1.7)
ALP SERPL-CCNC: 127 U/L (ref 45–117)
ALT SERPL-CCNC: 23 U/L (ref 13–56)
ANION GAP SERPL CALC-SCNC: 7 MMOL/L (ref 3–18)
AST SERPL-CCNC: 37 U/L (ref 10–38)
BACTERIA SPEC CULT: NORMAL
BACTERIA SPEC CULT: NORMAL
BILIRUB SERPL-MCNC: 0.2 MG/DL (ref 0.2–1)
BUN SERPL-MCNC: 7 MG/DL (ref 7–18)
BUN/CREAT SERPL: 8 (ref 12–20)
CALCIUM SERPL-MCNC: 8.5 MG/DL (ref 8.5–10.1)
CHLORIDE SERPL-SCNC: 103 MMOL/L (ref 100–111)
CO2 SERPL-SCNC: 29 MMOL/L (ref 21–32)
CREAT SERPL-MCNC: 0.87 MG/DL (ref 0.6–1.3)
GLOBULIN SER CALC-MCNC: 3.6 G/DL (ref 2–4)
GLUCOSE SERPL-MCNC: 101 MG/DL (ref 74–99)
POTASSIUM SERPL-SCNC: 3.7 MMOL/L (ref 3.5–5.5)
PROT SERPL-MCNC: 5.6 G/DL (ref 6.4–8.2)
PTH RELATED PROT SERPL-SCNC: <2 PMOL/L
SERVICE CMNT-IMP: NORMAL
SERVICE CMNT-IMP: NORMAL
SODIUM SERPL-SCNC: 139 MMOL/L (ref 136–145)

## 2024-05-09 PROCEDURE — 80053 COMPREHEN METABOLIC PANEL: CPT

## 2024-05-09 PROCEDURE — 2700000000 HC OXYGEN THERAPY PER DAY

## 2024-05-09 PROCEDURE — 6360000002 HC RX W HCPCS: Performed by: STUDENT IN AN ORGANIZED HEALTH CARE EDUCATION/TRAINING PROGRAM

## 2024-05-09 PROCEDURE — 1100000003 HC PRIVATE W/ TELEMETRY

## 2024-05-09 PROCEDURE — 6370000000 HC RX 637 (ALT 250 FOR IP): Performed by: HOSPITALIST

## 2024-05-09 PROCEDURE — 6370000000 HC RX 637 (ALT 250 FOR IP): Performed by: INTERNAL MEDICINE

## 2024-05-09 PROCEDURE — 6360000002 HC RX W HCPCS: Performed by: HOSPITALIST

## 2024-05-09 PROCEDURE — 36415 COLL VENOUS BLD VENIPUNCTURE: CPT

## 2024-05-09 PROCEDURE — 74018 RADEX ABDOMEN 1 VIEW: CPT

## 2024-05-09 PROCEDURE — 2580000003 HC RX 258: Performed by: INTERNAL MEDICINE

## 2024-05-09 PROCEDURE — 99222 1ST HOSP IP/OBS MODERATE 55: CPT | Performed by: NURSE PRACTITIONER

## 2024-05-09 RX ADMIN — CALCIUM CARBONATE 500 MG: 500 TABLET, CHEWABLE ORAL at 03:40

## 2024-05-09 RX ADMIN — THYROID 60 MG: 30 TABLET ORAL at 08:34

## 2024-05-09 RX ADMIN — RIVAROXABAN 20 MG: 10 TABLET, FILM COATED ORAL at 18:51

## 2024-05-09 RX ADMIN — MORPHINE SULFATE 4 MG: 4 INJECTION, SOLUTION INTRAMUSCULAR; INTRAVENOUS at 08:33

## 2024-05-09 RX ADMIN — ROSUVASTATIN CALCIUM 10 MG: 10 TABLET, COATED ORAL at 21:12

## 2024-05-09 RX ADMIN — LEVOFLOXACIN 500 MG: 5 INJECTION, SOLUTION INTRAVENOUS at 12:24

## 2024-05-09 RX ADMIN — Medication 12.5 MG: at 18:46

## 2024-05-09 RX ADMIN — PANTOPRAZOLE SODIUM 40 MG: 40 TABLET, DELAYED RELEASE ORAL at 08:34

## 2024-05-09 RX ADMIN — FUROSEMIDE 20 MG: 10 INJECTION, SOLUTION INTRAMUSCULAR; INTRAVENOUS at 08:34

## 2024-05-09 RX ADMIN — MORPHINE SULFATE 4 MG: 4 INJECTION, SOLUTION INTRAMUSCULAR; INTRAVENOUS at 21:12

## 2024-05-09 RX ADMIN — DOCUSATE SODIUM 100 MG: 100 CAPSULE, LIQUID FILLED ORAL at 08:34

## 2024-05-09 RX ADMIN — DOCUSATE SODIUM 100 MG: 100 CAPSULE, LIQUID FILLED ORAL at 21:12

## 2024-05-09 RX ADMIN — ONDANSETRON 4 MG: 2 INJECTION INTRAMUSCULAR; INTRAVENOUS at 12:24

## 2024-05-09 RX ADMIN — POLYVINYL ALCOHOL, POVIDONE 2 DROP: 14; 6 SOLUTION/ DROPS OPHTHALMIC at 05:34

## 2024-05-09 RX ADMIN — SODIUM CHLORIDE, PRESERVATIVE FREE 10 ML: 5 INJECTION INTRAVENOUS at 08:34

## 2024-05-09 RX ADMIN — SODIUM CHLORIDE, PRESERVATIVE FREE 10 ML: 5 INJECTION INTRAVENOUS at 21:17

## 2024-05-09 ASSESSMENT — PAIN DESCRIPTION - DESCRIPTORS
DESCRIPTORS: ACHING
DESCRIPTORS: ACHING;DISCOMFORT

## 2024-05-09 ASSESSMENT — PAIN SCALES - WONG BAKER: WONGBAKER_NUMERICALRESPONSE: HURTS A LITTLE BIT

## 2024-05-09 ASSESSMENT — PAIN SCALES - GENERAL
PAINLEVEL_OUTOF10: 0
PAINLEVEL_OUTOF10: 5
PAINLEVEL_OUTOF10: 4
PAINLEVEL_OUTOF10: 7

## 2024-05-09 ASSESSMENT — PAIN DESCRIPTION - ORIENTATION
ORIENTATION: RIGHT
ORIENTATION: RIGHT

## 2024-05-09 ASSESSMENT — PAIN DESCRIPTION - LOCATION
LOCATION: FLANK
LOCATION: HIP

## 2024-05-09 ASSESSMENT — PAIN - FUNCTIONAL ASSESSMENT: PAIN_FUNCTIONAL_ASSESSMENT: ACTIVITIES ARE NOT PREVENTED

## 2024-05-09 NOTE — WOUND CARE
Wound Care Note:    Chart audited for low adan score of 16, patient with high risk for skin breakdown, pressure injury?no.     Skin Care & Pressure Relief Recommendations  Minimize layers of linen  Pads under patient to optimize support surface  Remind or assist patient to turn/reposition approximately every 2 hours  Consult wound care if any wounds noted during admission

## 2024-05-09 NOTE — CONSULTS
Phone: [phone]  Paging : [phone]     Hematology/Oncology Consult Note    Patient: Madina Schafer MRN: [id number]  CSN: [id number]    YOB: 1955  Age: 69 y.o.  Sex: female    DOA: 5/2/2024 LOS:  LOS: 2 days            REASON FOR CONSULTATION:     70yo w lung nodules and bone admitted w/ hypercalcemia.    ASSESSMENT:     70yo w lung nodules and bone admitted w/ hypercalcemia.    PLAN:   Check CT Abd/pelvis  Zometa ordered for hypercalcemia  CT guided bx of soft tissue mass or radiologist choice  Will follow peripherally until biopsy results  Likely will need to follow with a [facility] provider in [place] - Likely Dr. Hong  High suspicion for malignancy    HPI:     Madina Schafer is a 69 y.o., White (non-), female, who I have been asked to see for lung nodules and bone admitted w/ hypercalcemia. Patient with +progressive SOB. No weight loss.No tobacco use. Second home tobacco exposure.    Past Medical History:   Diagnosis Date    Arthritis     hands    Chronic pain     right SI joint    GERD (gastroesophageal reflux disease)     Hypertension     Other ill-defined conditions(669.89)     high cholestrol    Thyroid disease        Past Surgical History:   Procedure Laterality Date    BIOPSY OF BREAST, INCISIONAL Right     fibroadenoma    GI      colonoscopy    HYSTERECTOMY (CERVIX STATUS UNKNOWN)  2006    TVH    OVARY REMOVAL Bilateral 2006    SHOULDER ARTHROSCOPY Left 2011    SHOULDER ARTHROSCOPY Right 2019    TONSILLECTOMY  1976    UROLOGICAL SURGERY      cystoscopy       Family History   Problem Relation Age of Onset    Heart Disease Mother     Cancer Father     Heart Disease Brother        Social History     Socioeconomic History    Marital status:      Spouse name: None    Number of children: None    Years of education: None    Highest education level: None   Tobacco Use    Smoking status: Never    Smokeless tobacco: Never   Substance and Sexual Activity    Alcohol use: 
INTERVENTIONAL RADIOLOGY  Consult Note    Patient:  Madina Schafer  :  1955  Age:  69 y.o.  MRN:  [id number]    Today's Date:  5/3/2024  Admission Date:  2024  Hospital Day:  0  Consult requested by:  Saima Lee MD; Juan Pablo Paiz MD       CC / HPI   Madina Schafer is a 69 y.o. female with a history of CAD, HTN, bilateral pleural effusions. Presented with weakness. New DVT LLE, on heparin gtt. CT imaging revealed new lung nodules, bony lesions of sternum and right 1st rib, and left adrenal nodule, presumed metastatic. Oncology following.     IR consulted for thoracentesis, evaluation for CT bone lesion biopsy.    Patient seen at bedside, resting. C/o right hip pain, generalized weakness.    PAST MEDICAL HISTORY  Past Medical History:   Diagnosis Date    Arthritis     hands    Chronic pain     right SI joint    GERD (gastroesophageal reflux disease)     Hypertension     Other ill-defined conditions(799.89)     high cholestrol    Thyroid disease        PAST SURGICAL HISTORY  Past Surgical History:   Procedure Laterality Date    BIOPSY OF BREAST, INCISIONAL Right     fibroadenoma    GI      colonoscopy    HYSTERECTOMY (CERVIX STATUS UNKNOWN)      TVH    OVARY REMOVAL Bilateral     SHOULDER ARTHROSCOPY Left     SHOULDER ARTHROSCOPY Right 2019    TONSILLECTOMY  1976    UROLOGICAL SURGERY      cystoscopy       CURRENT MEDICATIONS  Current Facility-Administered Medications   Medication Dose Route Frequency Provider Last Rate Last Admin    [START ON 2024] cefTRIAXone (ROCEPHIN) 1,000 mg in sodium chloride 0.9 % 50 mL IVPB (mini-bag)  1,000 mg IntraVENous Q24H Saima Lee MD        0.9 % sodium chloride infusion   IntraVENous Continuous Saima Lee  mL/hr at 24 0559 New Bag at 24 0559    morphine sulfate (PF) injection 4 mg  4 mg IntraVENous Q4H PRN Davis Soto MD   4 mg at 24 1048    ondansetron (ZOFRAN) injection 4 mg  4 mg 
Palliative Medicine  Patient Name: Madina Schafer  YOB: 1955  MRN: [id number]  Age: 69 y.o.  Gender: female    Date of Initial Consult: 5/9/2024  Date of Service: 5/9/2024  Time: 12:12 PM  Provider: OSCAR Felder NP  Hospital Day: 8  Admit Date: 5/2/2024  Referring Provider: Jose Leiva MD         Reasons for Consultation:  Goals of Care    HISTORY OF PRESENT ILLNESS (HPI):   Madina Schafer is a 69 y.o. female with a past medical history of CAD with stent, HTN, and DVT on Xarelto, who was admitted on 5/2/2024 from home with a diagnosis of hypercalcemia, pleural effusions, UTI, anemia, renal mass and metastatic disease.         PALLIATIVE DIAGNOSES:    Goals of care discussions/advance care planning  hypercalcemia  Renal mass with metastatic disease  Anemia  UTI  Pleural effusions     ASSESSMENT AND PLAN:     5/9/2024: Palliative medicine team including  CARLOS Dodson and I met with patient at patient's bedside.  Patient is awake, alert, oriented x 4, engaged in goals of care conversations.  Introduced our role as palliative medicine team.  Patient does not have an AMD on file, has an old AMD from 2012 in scanned documents but patient states this one is no longer valid and she did an updated AMD in 2019 naming her brother Cristóbal Garcias as primary MPOA. Encouraged her to give a copy to her PCP when she gets discharged as she has no one to bring in a copy for her.  Patient is , no biological children, has 2 stepchildren, no living parents, 2 siblings, Cristóbal lives in [place] and her other brother is too ill to participate in care decisions for patient (per patient).  Patient states that she lives in a house alone, and over the past couple of weeks she has had gradual functional decline, poor appetite, and it has become increasingly more difficult to take care of herself.  She states she spoke to Dr. Mason this morning, and understands she likely has renal 
  Thank you for allowing me to participate in the management of this pleasant patient.  Please feel free to contact me if you have any questions or concerns.      Signed By: ANN DAMON MD     May 3, 2024

## 2024-05-09 NOTE — ACP (ADVANCE CARE PLANNING)
Advance Care Planning        Palliative Team Advance Care Planning (ACP) Conversation      Date of Conversation: 05/09/24      Individuals present for the conversation: Patient with decision making capacity, Mallory Patton NP (Palliative) and this writer.        ACP documents on file prior to discussion:  -None      Previously completed document/s not on file: -None      Healthcare Decision Maker:     Patient does not have a formal healthcare decision maker document, on file. Patient reported that she did complete a healthcare decision maker document. She stated that the documents are the [place] Living Will Registry. The Palliative Care team will work towards securing a copy of the document. Patient's brother (Cristóbal Garcias, Ph#[phone] and Ph#[phone]) is her legal next-of-kin and default healthcare decision maker.      Conversation Summary:      This writer, along with Mallory Patton NP, with the Palliative Care team; visited with patient today to offer support and also address healthcare decision maker(s). Patient was laying in bed; alert and oriented. Patient reported that she lives alone and has limited supports. Patient reported that her  passed away and her health and overall functioning have declined recently; making her feel unsafe to return home alone.     Patient was independent with her ADLs and IADLs; however, she is needing more assistance now with her ADLs and IADLs. Patient has been experiencing increased generalized weakness, fatigue and decreased appetite. She has also experienced some falls. It is reported that patient has experienced an 8 pound weight loss over the last 3 weeks.     Patient recently was informed that she has kidney Cancer and was told the treatment options. Patient is interested in starting the radiation treatment; every 6 weeks. Patient also needs some skilled rehab; prior to returning home. The issue becomes; patient cannot be in skilled rehab and start

## 2024-05-10 PROBLEM — C64.9 RENAL CELL CARCINOMA (HCC): Status: ACTIVE | Noted: 2024-05-10

## 2024-05-10 LAB
ALBUMIN SERPL ELPH-MCNC: 2.3 G/DL (ref 2.9–4.4)
ALBUMIN SERPL-MCNC: 1.9 G/DL (ref 3.4–5)
ALBUMIN/GLOB SERPL: 0.5 (ref 0.8–1.7)
ALBUMIN/GLOB SERPL: 0.8 (ref 0.7–1.7)
ALP SERPL-CCNC: 124 U/L (ref 45–117)
ALPHA1 GLOB SERPL ELPH-MCNC: 0.5 G/DL (ref 0–0.4)
ALPHA2 GLOB SERPL ELPH-MCNC: 1.2 G/DL (ref 0.4–1)
ALT SERPL-CCNC: 22 U/L (ref 13–56)
ANION GAP SERPL CALC-SCNC: 5 MMOL/L (ref 3–18)
AST SERPL-CCNC: 29 U/L (ref 10–38)
B-GLOBULIN SERPL ELPH-MCNC: 0.9 G/DL (ref 0.7–1.3)
BILIRUB SERPL-MCNC: 0.3 MG/DL (ref 0.2–1)
BUN SERPL-MCNC: 10 MG/DL (ref 7–18)
BUN/CREAT SERPL: 11 (ref 12–20)
CALCIUM SERPL-MCNC: 8.6 MG/DL (ref 8.5–10.1)
CHLORIDE SERPL-SCNC: 102 MMOL/L (ref 100–111)
CO2 SERPL-SCNC: 31 MMOL/L (ref 21–32)
CREAT SERPL-MCNC: 0.89 MG/DL (ref 0.6–1.3)
EKG ATRIAL RATE: 100 BPM
EKG DIAGNOSIS: NORMAL
EKG P AXIS: 52 DEGREES
EKG P-R INTERVAL: 136 MS
EKG Q-T INTERVAL: 332 MS
EKG QRS DURATION: 78 MS
EKG QTC CALCULATION (BAZETT): 428 MS
EKG R AXIS: 43 DEGREES
EKG T AXIS: 47 DEGREES
EKG VENTRICULAR RATE: 100 BPM
GAMMA GLOB SERPL ELPH-MCNC: 0.6 G/DL (ref 0.4–1.8)
GLOBULIN SER CALC-MCNC: 3.6 G/DL (ref 2–4)
GLOBULIN SER-MCNC: 3.2 G/DL (ref 2.2–3.9)
GLUCOSE SERPL-MCNC: 107 MG/DL (ref 74–99)
IGA SERPL-MCNC: 205 MG/DL (ref 87–352)
IGG SERPL-MCNC: 621 MG/DL (ref 586–1602)
IGM SERPL-MCNC: 90 MG/DL (ref 26–217)
INTERPRETATION SERPL IEP-IMP: ABNORMAL
KAPPA LC FREE SER-MCNC: 27.1 MG/L (ref 3.3–19.4)
KAPPA LC FREE/LAMBDA FREE SER: 1.05 (ref 0.26–1.65)
LAMBDA LC FREE SERPL-MCNC: 25.7 MG/L (ref 5.7–26.3)
M PROTEIN SERPL ELPH-MCNC: ABNORMAL G/DL
POTASSIUM SERPL-SCNC: 3.5 MMOL/L (ref 3.5–5.5)
PROT SERPL-MCNC: 5.5 G/DL (ref 6.4–8.2)
PROT SERPL-MCNC: 5.5 G/DL (ref 6–8.5)
SODIUM SERPL-SCNC: 138 MMOL/L (ref 136–145)

## 2024-05-10 PROCEDURE — 2580000003 HC RX 258: Performed by: INTERNAL MEDICINE

## 2024-05-10 PROCEDURE — 97530 THERAPEUTIC ACTIVITIES: CPT

## 2024-05-10 PROCEDURE — 6360000002 HC RX W HCPCS: Performed by: HOSPITALIST

## 2024-05-10 PROCEDURE — 80053 COMPREHEN METABOLIC PANEL: CPT

## 2024-05-10 PROCEDURE — 36415 COLL VENOUS BLD VENIPUNCTURE: CPT

## 2024-05-10 PROCEDURE — 6370000000 HC RX 637 (ALT 250 FOR IP): Performed by: INTERNAL MEDICINE

## 2024-05-10 PROCEDURE — 97116 GAIT TRAINING THERAPY: CPT

## 2024-05-10 PROCEDURE — 6370000000 HC RX 637 (ALT 250 FOR IP): Performed by: HOSPITALIST

## 2024-05-10 PROCEDURE — 6360000002 HC RX W HCPCS: Performed by: STUDENT IN AN ORGANIZED HEALTH CARE EDUCATION/TRAINING PROGRAM

## 2024-05-10 PROCEDURE — 2700000000 HC OXYGEN THERAPY PER DAY

## 2024-05-10 PROCEDURE — 1100000003 HC PRIVATE W/ TELEMETRY

## 2024-05-10 RX ORDER — POLYETHYLENE GLYCOL 3350 17 G/17G
17 POWDER, FOR SOLUTION ORAL DAILY
Status: DISCONTINUED | OUTPATIENT
Start: 2024-05-10 | End: 2024-05-13 | Stop reason: HOSPADM

## 2024-05-10 RX ADMIN — SODIUM CHLORIDE, PRESERVATIVE FREE 10 ML: 5 INJECTION INTRAVENOUS at 20:24

## 2024-05-10 RX ADMIN — SODIUM CHLORIDE, PRESERVATIVE FREE 10 ML: 5 INJECTION INTRAVENOUS at 09:37

## 2024-05-10 RX ADMIN — MORPHINE SULFATE 4 MG: 4 INJECTION, SOLUTION INTRAMUSCULAR; INTRAVENOUS at 14:38

## 2024-05-10 RX ADMIN — ONDANSETRON 4 MG: 2 INJECTION INTRAMUSCULAR; INTRAVENOUS at 20:23

## 2024-05-10 RX ADMIN — RIVAROXABAN 20 MG: 10 TABLET, FILM COATED ORAL at 17:51

## 2024-05-10 RX ADMIN — DOCUSATE SODIUM 100 MG: 100 CAPSULE, LIQUID FILLED ORAL at 09:27

## 2024-05-10 RX ADMIN — PANTOPRAZOLE SODIUM 40 MG: 40 TABLET, DELAYED RELEASE ORAL at 09:27

## 2024-05-10 RX ADMIN — FUROSEMIDE 20 MG: 10 INJECTION, SOLUTION INTRAMUSCULAR; INTRAVENOUS at 09:27

## 2024-05-10 RX ADMIN — POLYETHYLENE GLYCOL 3350 17 G: 17 POWDER, FOR SOLUTION ORAL at 12:27

## 2024-05-10 RX ADMIN — DOCUSATE SODIUM 100 MG: 100 CAPSULE, LIQUID FILLED ORAL at 20:23

## 2024-05-10 RX ADMIN — THYROID 60 MG: 30 TABLET ORAL at 09:27

## 2024-05-10 RX ADMIN — LEVOFLOXACIN 500 MG: 5 INJECTION, SOLUTION INTRAVENOUS at 12:27

## 2024-05-10 RX ADMIN — Medication 12.5 MG: at 21:00

## 2024-05-10 RX ADMIN — OXYCODONE HYDROCHLORIDE AND ACETAMINOPHEN 1 TABLET: 5; 325 TABLET ORAL at 09:30

## 2024-05-10 RX ADMIN — ROSUVASTATIN CALCIUM 10 MG: 10 TABLET, COATED ORAL at 20:23

## 2024-05-10 ASSESSMENT — PAIN SCALES - GENERAL
PAINLEVEL_OUTOF10: 5
PAINLEVEL_OUTOF10: 0
PAINLEVEL_OUTOF10: 6
PAINLEVEL_OUTOF10: 2
PAINLEVEL_OUTOF10: 2

## 2024-05-10 ASSESSMENT — PAIN DESCRIPTION - DESCRIPTORS
DESCRIPTORS: ACHING
DESCRIPTORS: ACHING

## 2024-05-10 ASSESSMENT — PAIN DESCRIPTION - LOCATION
LOCATION: HIP
LOCATION: GENERALIZED

## 2024-05-11 LAB
ALBUMIN SERPL-MCNC: 1.9 G/DL (ref 3.4–5)
ALBUMIN/GLOB SERPL: 0.5 (ref 0.8–1.7)
ALP SERPL-CCNC: 127 U/L (ref 45–117)
ALT SERPL-CCNC: 22 U/L (ref 13–56)
ANION GAP SERPL CALC-SCNC: 7 MMOL/L (ref 3–18)
AST SERPL-CCNC: 30 U/L (ref 10–38)
BILIRUB SERPL-MCNC: 0.3 MG/DL (ref 0.2–1)
BUN SERPL-MCNC: 10 MG/DL (ref 7–18)
BUN/CREAT SERPL: 12 (ref 12–20)
CALCIUM SERPL-MCNC: 8.6 MG/DL (ref 8.5–10.1)
CHLORIDE SERPL-SCNC: 101 MMOL/L (ref 100–111)
CO2 SERPL-SCNC: 30 MMOL/L (ref 21–32)
CREAT SERPL-MCNC: 0.83 MG/DL (ref 0.6–1.3)
GLOBULIN SER CALC-MCNC: 3.9 G/DL (ref 2–4)
GLUCOSE SERPL-MCNC: 97 MG/DL (ref 74–99)
POTASSIUM SERPL-SCNC: 3.9 MMOL/L (ref 3.5–5.5)
PROT SERPL-MCNC: 5.8 G/DL (ref 6.4–8.2)
SODIUM SERPL-SCNC: 138 MMOL/L (ref 136–145)

## 2024-05-11 PROCEDURE — 6360000002 HC RX W HCPCS: Performed by: STUDENT IN AN ORGANIZED HEALTH CARE EDUCATION/TRAINING PROGRAM

## 2024-05-11 PROCEDURE — 80053 COMPREHEN METABOLIC PANEL: CPT

## 2024-05-11 PROCEDURE — 6370000000 HC RX 637 (ALT 250 FOR IP): Performed by: HOSPITALIST

## 2024-05-11 PROCEDURE — 36415 COLL VENOUS BLD VENIPUNCTURE: CPT

## 2024-05-11 PROCEDURE — 2700000000 HC OXYGEN THERAPY PER DAY

## 2024-05-11 PROCEDURE — 1100000003 HC PRIVATE W/ TELEMETRY

## 2024-05-11 PROCEDURE — 6370000000 HC RX 637 (ALT 250 FOR IP): Performed by: INTERNAL MEDICINE

## 2024-05-11 PROCEDURE — 2580000003 HC RX 258: Performed by: INTERNAL MEDICINE

## 2024-05-11 RX ADMIN — SODIUM CHLORIDE, PRESERVATIVE FREE 10 ML: 5 INJECTION INTRAVENOUS at 20:15

## 2024-05-11 RX ADMIN — THYROID 60 MG: 30 TABLET ORAL at 09:37

## 2024-05-11 RX ADMIN — ROSUVASTATIN CALCIUM 10 MG: 10 TABLET, COATED ORAL at 20:14

## 2024-05-11 RX ADMIN — RIVAROXABAN 20 MG: 10 TABLET, FILM COATED ORAL at 17:38

## 2024-05-11 RX ADMIN — ONDANSETRON 4 MG: 2 INJECTION INTRAMUSCULAR; INTRAVENOUS at 17:38

## 2024-05-11 RX ADMIN — DOCUSATE SODIUM 100 MG: 100 CAPSULE, LIQUID FILLED ORAL at 20:14

## 2024-05-11 RX ADMIN — DOCUSATE SODIUM 100 MG: 100 CAPSULE, LIQUID FILLED ORAL at 09:37

## 2024-05-11 RX ADMIN — OXYCODONE HYDROCHLORIDE AND ACETAMINOPHEN 1 TABLET: 5; 325 TABLET ORAL at 09:37

## 2024-05-11 RX ADMIN — POLYETHYLENE GLYCOL 3350 17 G: 17 POWDER, FOR SOLUTION ORAL at 09:38

## 2024-05-11 RX ADMIN — PANTOPRAZOLE SODIUM 40 MG: 40 TABLET, DELAYED RELEASE ORAL at 09:37

## 2024-05-11 RX ADMIN — MORPHINE SULFATE 4 MG: 4 INJECTION, SOLUTION INTRAMUSCULAR; INTRAVENOUS at 23:10

## 2024-05-11 RX ADMIN — OXYCODONE HYDROCHLORIDE AND ACETAMINOPHEN 1 TABLET: 5; 325 TABLET ORAL at 17:38

## 2024-05-11 RX ADMIN — SODIUM CHLORIDE, PRESERVATIVE FREE 10 ML: 5 INJECTION INTRAVENOUS at 09:38

## 2024-05-11 ASSESSMENT — PAIN SCALES - WONG BAKER
WONGBAKER_NUMERICALRESPONSE: NO HURT

## 2024-05-11 ASSESSMENT — PAIN DESCRIPTION - DESCRIPTORS
DESCRIPTORS: ACHING

## 2024-05-11 ASSESSMENT — PAIN DESCRIPTION - PAIN TYPE: TYPE: CHRONIC PAIN

## 2024-05-11 ASSESSMENT — PAIN DESCRIPTION - LOCATION
LOCATION: HIP

## 2024-05-11 ASSESSMENT — PAIN DESCRIPTION - ORIENTATION
ORIENTATION: RIGHT

## 2024-05-11 ASSESSMENT — PAIN SCALES - GENERAL
PAINLEVEL_OUTOF10: 2
PAINLEVEL_OUTOF10: 2
PAINLEVEL_OUTOF10: 7
PAINLEVEL_OUTOF10: 5
PAINLEVEL_OUTOF10: 6
PAINLEVEL_OUTOF10: 2

## 2024-05-11 ASSESSMENT — PAIN DESCRIPTION - FREQUENCY: FREQUENCY: CONTINUOUS

## 2024-05-11 ASSESSMENT — PAIN DESCRIPTION - ONSET: ONSET: ON-GOING

## 2024-05-11 ASSESSMENT — PAIN - FUNCTIONAL ASSESSMENT: PAIN_FUNCTIONAL_ASSESSMENT: PREVENTS OR INTERFERES SOME ACTIVE ACTIVITIES AND ADLS

## 2024-05-12 LAB
ALBUMIN SERPL-MCNC: 1.9 G/DL (ref 3.4–5)
ALBUMIN/GLOB SERPL: 0.5 (ref 0.8–1.7)
ALP SERPL-CCNC: 129 U/L (ref 45–117)
ALT SERPL-CCNC: 20 U/L (ref 13–56)
ANION GAP SERPL CALC-SCNC: 8 MMOL/L (ref 3–18)
AST SERPL-CCNC: 28 U/L (ref 10–38)
BILIRUB SERPL-MCNC: 0.3 MG/DL (ref 0.2–1)
BUN SERPL-MCNC: 11 MG/DL (ref 7–18)
BUN/CREAT SERPL: 13 (ref 12–20)
CALCIUM SERPL-MCNC: 8.7 MG/DL (ref 8.5–10.1)
CHLORIDE SERPL-SCNC: 101 MMOL/L (ref 100–111)
CO2 SERPL-SCNC: 29 MMOL/L (ref 21–32)
CREAT SERPL-MCNC: 0.85 MG/DL (ref 0.6–1.3)
GLOBULIN SER CALC-MCNC: 4 G/DL (ref 2–4)
GLUCOSE SERPL-MCNC: 97 MG/DL (ref 74–99)
POTASSIUM SERPL-SCNC: 3.6 MMOL/L (ref 3.5–5.5)
PROT SERPL-MCNC: 5.9 G/DL (ref 6.4–8.2)
SODIUM SERPL-SCNC: 138 MMOL/L (ref 136–145)

## 2024-05-12 PROCEDURE — 97116 GAIT TRAINING THERAPY: CPT

## 2024-05-12 PROCEDURE — 80053 COMPREHEN METABOLIC PANEL: CPT

## 2024-05-12 PROCEDURE — 36415 COLL VENOUS BLD VENIPUNCTURE: CPT

## 2024-05-12 PROCEDURE — 2580000003 HC RX 258: Performed by: INTERNAL MEDICINE

## 2024-05-12 PROCEDURE — 6370000000 HC RX 637 (ALT 250 FOR IP): Performed by: INTERNAL MEDICINE

## 2024-05-12 PROCEDURE — 6370000000 HC RX 637 (ALT 250 FOR IP): Performed by: HOSPITALIST

## 2024-05-12 PROCEDURE — 97530 THERAPEUTIC ACTIVITIES: CPT

## 2024-05-12 PROCEDURE — 2700000000 HC OXYGEN THERAPY PER DAY

## 2024-05-12 PROCEDURE — 6360000002 HC RX W HCPCS: Performed by: STUDENT IN AN ORGANIZED HEALTH CARE EDUCATION/TRAINING PROGRAM

## 2024-05-12 PROCEDURE — 1100000003 HC PRIVATE W/ TELEMETRY

## 2024-05-12 RX ADMIN — DOCUSATE SODIUM 100 MG: 100 CAPSULE, LIQUID FILLED ORAL at 07:43

## 2024-05-12 RX ADMIN — RIVAROXABAN 20 MG: 10 TABLET, FILM COATED ORAL at 18:52

## 2024-05-12 RX ADMIN — ONDANSETRON 4 MG: 2 INJECTION INTRAMUSCULAR; INTRAVENOUS at 20:16

## 2024-05-12 RX ADMIN — DOCUSATE SODIUM 100 MG: 100 CAPSULE, LIQUID FILLED ORAL at 20:11

## 2024-05-12 RX ADMIN — OXYCODONE HYDROCHLORIDE AND ACETAMINOPHEN 1 TABLET: 5; 325 TABLET ORAL at 07:37

## 2024-05-12 RX ADMIN — THYROID 60 MG: 30 TABLET ORAL at 07:41

## 2024-05-12 RX ADMIN — POLYETHYLENE GLYCOL 3350 17 G: 17 POWDER, FOR SOLUTION ORAL at 07:40

## 2024-05-12 RX ADMIN — ROSUVASTATIN CALCIUM 10 MG: 10 TABLET, COATED ORAL at 20:11

## 2024-05-12 RX ADMIN — SODIUM CHLORIDE, PRESERVATIVE FREE 10 ML: 5 INJECTION INTRAVENOUS at 07:43

## 2024-05-12 RX ADMIN — SODIUM CHLORIDE, PRESERVATIVE FREE 10 ML: 5 INJECTION INTRAVENOUS at 20:12

## 2024-05-12 RX ADMIN — OXYCODONE HYDROCHLORIDE AND ACETAMINOPHEN 1 TABLET: 5; 325 TABLET ORAL at 20:11

## 2024-05-12 RX ADMIN — PANTOPRAZOLE SODIUM 40 MG: 40 TABLET, DELAYED RELEASE ORAL at 07:41

## 2024-05-12 ASSESSMENT — PAIN DESCRIPTION - ORIENTATION
ORIENTATION: RIGHT
ORIENTATION: RIGHT

## 2024-05-12 ASSESSMENT — PAIN DESCRIPTION - LOCATION
LOCATION: HIP
LOCATION: HIP

## 2024-05-12 ASSESSMENT — PAIN SCALES - GENERAL
PAINLEVEL_OUTOF10: 6
PAINLEVEL_OUTOF10: 2
PAINLEVEL_OUTOF10: 6

## 2024-05-12 ASSESSMENT — PAIN DESCRIPTION - DESCRIPTORS
DESCRIPTORS: ACHING
DESCRIPTORS: ACHING

## 2024-05-12 NOTE — PLAN OF CARE
Problem: Discharge Planning  Goal: Discharge to home or other facility with appropriate resources  5/3/2024 0918 by Sue Galvan RN  Outcome: Progressing  Flowsheets (Taken 5/3/2024 0800)  Discharge to home or other facility with appropriate resources:   Identify barriers to discharge with patient and caregiver   Arrange for needed discharge resources and transportation as appropriate   Identify discharge learning needs (meds, wound care, etc)  5/3/2024 0635 by Debbie Deutsch RN  Outcome: Progressing  Flowsheets  Taken 5/3/2024 0553  Discharge to home or other facility with appropriate resources:   Identify barriers to discharge with patient and caregiver   Arrange for needed discharge resources and transportation as appropriate   Identify discharge learning needs (meds, wound care, etc)  Taken 5/3/2024 0515  Discharge to home or other facility with appropriate resources:   Identify barriers to discharge with patient and caregiver   Arrange for needed discharge resources and transportation as appropriate   Identify discharge learning needs (meds, wound care, etc)     Problem: Pain  Goal: Verbalizes/displays adequate comfort level or baseline comfort level  5/3/2024 0918 by Sue Galvan RN  Outcome: Progressing  5/3/2024 0635 by Debbie Deutsch RN  Outcome: Progressing  Flowsheets (Taken 5/3/2024 0515)  Verbalizes/displays adequate comfort level or baseline comfort level:   Encourage patient to monitor pain and request assistance   Assess pain using appropriate pain scale   Administer analgesics based on type and severity of pain and evaluate response     Problem: Safety - Adult  Goal: Free from fall injury  5/3/2024 0918 by Sue Galvan RN  Outcome: Progressing  5/3/2024 0635 by Debbie Deutsch RN  Outcome: Progressing     Problem: ABCDS Injury Assessment  Goal: Absence of physical injury  5/3/2024 0918 by Sue Galvan RN  Outcome: Progressing  5/3/2024 0635 by Debbie Deutsch RN  Outcome: Progressing   
  Problem: Discharge Planning  Goal: Discharge to home or other facility with appropriate resources  5/8/2024 1013 by Jyoti Ordonez RN  Outcome: Progressing  5/7/2024 2129 by Debbie Deutsch RN  Outcome: Progressing  Flowsheets (Taken 5/7/2024 1920)  Discharge to home or other facility with appropriate resources:   Identify barriers to discharge with patient and caregiver   Arrange for needed discharge resources and transportation as appropriate   Identify discharge learning needs (meds, wound care, etc)     Problem: Pain  Goal: Verbalizes/displays adequate comfort level or baseline comfort level  5/8/2024 1013 by Jyoti Ordonez RN  Outcome: Progressing  5/7/2024 2129 by Debbie Deutsch RN  Outcome: Progressing  Flowsheets (Taken 5/7/2024 1920)  Verbalizes/displays adequate comfort level or baseline comfort level:   Encourage patient to monitor pain and request assistance   Assess pain using appropriate pain scale   Administer analgesics based on type and severity of pain and evaluate response     Problem: Safety - Adult  Goal: Free from fall injury  5/8/2024 1013 by Jyoti Ordonez RN  Outcome: Progressing  5/7/2024 2129 by Debbie Deutsch RN  Outcome: Progressing     Problem: ABCDS Injury Assessment  Goal: Absence of physical injury  5/8/2024 1013 by Jyoti Ordonez RN  Outcome: Progressing  5/7/2024 2129 by Debbie Deutsch RN  Outcome: Progressing     Problem: Skin/Tissue Integrity  Goal: Absence of new skin breakdown  Description: 1.  Monitor for areas of redness and/or skin breakdown  2.  Assess vascular access sites hourly  3.  Every 4-6 hours minimum:  Change oxygen saturation probe site  4.  Every 4-6 hours:  If on nasal continuous positive airway pressure, respiratory therapy assess nares and determine need for appliance change or resting period.  5/8/2024 1013 by Jyoti Ordonez RN  Outcome: Progressing  5/7/2024 2129 by Debbie Deutsch RN  Outcome: Progressing     Problem: Hematologic - Adult  Goal: Maintains 
  Problem: Discharge Planning  Goal: Discharge to home or other facility with appropriate resources  5/9/2024 1029 by Aby Landis RN  Outcome: Progressing  Flowsheets (Taken 5/9/2024 0710)  Discharge to home or other facility with appropriate resources:   Identify barriers to discharge with patient and caregiver   Arrange for needed discharge resources and transportation as appropriate   Identify discharge learning needs (meds, wound care, etc)   Refer to discharge planning if patient needs post-hospital services based on physician order or complex needs related to functional status, cognitive ability or social support system  5/9/2024 0017 by Isaac Echeverria RN  Outcome: Progressing     Problem: Pain  Goal: Verbalizes/displays adequate comfort level or baseline comfort level  5/9/2024 1029 by Aby Landis RN  Outcome: Progressing  5/9/2024 0017 by Isaac Echeverria RN  Outcome: Progressing     Problem: Safety - Adult  Goal: Free from fall injury  5/9/2024 1029 by Aby Landis RN  Outcome: Progressing  5/9/2024 0017 by Isaac Echeverria RN  Outcome: Progressing     Problem: ABCDS Injury Assessment  Goal: Absence of physical injury  5/9/2024 1029 by Aby Landis RN  Outcome: Progressing  5/9/2024 0017 by Isaac Echeverria RN  Outcome: Progressing     Problem: Skin/Tissue Integrity  Goal: Absence of new skin breakdown  Description: 1.  Monitor for areas of redness and/or skin breakdown  2.  Assess vascular access sites hourly  3.  Every 4-6 hours minimum:  Change oxygen saturation probe site  4.  Every 4-6 hours:  If on nasal continuous positive airway pressure, respiratory therapy assess nares and determine need for appliance change or resting period.  5/9/2024 1029 by Aby Landis, RN  Outcome: Progressing  5/9/2024 0017 by Isaac Echeverria RN  Outcome: Progressing     Problem: Hematologic - Adult  Goal: Maintains hematologic stability  5/9/2024 1029 by bAy Landis RN  Outcome: Progressing  Flowsheets 
  Problem: Discharge Planning  Goal: Discharge to home or other facility with appropriate resources  5/9/2024 2329 by Anahi Santos RN  Outcome: Progressing  5/9/2024 2308 by Isaac Echeverria RN  Outcome: Progressing  Flowsheets (Taken 5/9/2024 2000)  Discharge to home or other facility with appropriate resources:   Identify barriers to discharge with patient and caregiver   Arrange for needed discharge resources and transportation as appropriate   Identify discharge learning needs (meds, wound care, etc)  5/9/2024 1029 by Aby Landis RN  Outcome: Progressing  Flowsheets (Taken 5/9/2024 0710)  Discharge to home or other facility with appropriate resources:   Identify barriers to discharge with patient and caregiver   Arrange for needed discharge resources and transportation as appropriate   Identify discharge learning needs (meds, wound care, etc)   Refer to discharge planning if patient needs post-hospital services based on physician order or complex needs related to functional status, cognitive ability or social support system     Problem: Pain  Goal: Verbalizes/displays adequate comfort level or baseline comfort level  5/9/2024 2329 by Anahi Santos RN  Outcome: Progressing  5/9/2024 2308 by Isaac Echeverria RN  Outcome: Progressing  5/9/2024 1029 by Aby Landis RN  Outcome: Progressing     Problem: Safety - Adult  Goal: Free from fall injury  5/9/2024 2329 by Anahi Santos RN  Outcome: Progressing  5/9/2024 2308 by Isaac Echeverria RN  Outcome: Progressing  Flowsheets (Taken 5/9/2024 2000)  Free From Fall Injury: Instruct family/caregiver on patient safety  5/9/2024 1029 by Aby Landis, RN  Outcome: Progressing     Problem: ABCDS Injury Assessment  Goal: Absence of physical injury  5/9/2024 2329 by Anahi Santos, RN  Outcome: Progressing  5/9/2024 2308 by Isaac Echeverria RN  Outcome: Progressing  5/9/2024 1029 by Aby Landis, RN  Outcome: Progressing     Problem: Skin/Tissue 
  Problem: Discharge Planning  Goal: Discharge to home or other facility with appropriate resources  Outcome: Progressing     Problem: Pain  Goal: Verbalizes/displays adequate comfort level or baseline comfort level  Outcome: Progressing     Problem: Safety - Adult  Goal: Free from fall injury  Outcome: Progressing     Problem: ABCDS Injury Assessment  Goal: Absence of physical injury  Outcome: Progressing     Problem: Skin/Tissue Integrity  Goal: Absence of new skin breakdown  Description: 1.  Monitor for areas of redness and/or skin breakdown  2.  Assess vascular access sites hourly  3.  Every 4-6 hours minimum:  Change oxygen saturation probe site  4.  Every 4-6 hours:  If on nasal continuous positive airway pressure, respiratory therapy assess nares and determine need for appliance change or resting period.  Outcome: Progressing     Problem: Hematologic - Adult  Goal: Maintains hematologic stability  Outcome: Progressing     
  Problem: Discharge Planning  Goal: Discharge to home or other facility with appropriate resources  Outcome: Progressing     Problem: Pain  Goal: Verbalizes/displays adequate comfort level or baseline comfort level  Outcome: Progressing     Problem: Safety - Adult  Goal: Free from fall injury  Outcome: Progressing     Problem: ABCDS Injury Assessment  Goal: Absence of physical injury  Outcome: Progressing     Problem: Skin/Tissue Integrity  Goal: Absence of new skin breakdown  Description: 1.  Monitor for areas of redness and/or skin breakdown  2.  Assess vascular access sites hourly  3.  Every 4-6 hours minimum:  Change oxygen saturation probe site  4.  Every 4-6 hours:  If on nasal continuous positive airway pressure, respiratory therapy assess nares and determine need for appliance change or resting period.  Outcome: Progressing     Problem: Hematologic - Adult  Goal: Maintains hematologic stability  Outcome: Progressing  Flowsheets (Taken 5/11/2024 2000)  Maintains hematologic stability:   Assess for signs and symptoms of bleeding or hemorrhage   Monitor labs for bleeding or clotting disorders   Administer blood products/factors as ordered     Problem: Nutrition Deficit:  Goal: Optimize nutritional status  Outcome: Progressing     Problem: Skin/Tissue Integrity - Adult  Goal: Skin integrity remains intact  Outcome: Progressing  Flowsheets (Taken 5/11/2024 2000)  Skin Integrity Remains Intact:   Monitor for areas of redness and/or skin breakdown   Every 4-6 hours minimum: Change oxygen saturation probe site   Every 4-6 hours: If on nasal continuous positive airway pressure, respiratory therapy assesses nares and determine need for appliance change or resting period     
  Problem: Discharge Planning  Goal: Discharge to home or other facility with appropriate resources  Outcome: Progressing  Flowsheets  Taken 5/3/2024 0553  Discharge to home or other facility with appropriate resources:   Identify barriers to discharge with patient and caregiver   Arrange for needed discharge resources and transportation as appropriate   Identify discharge learning needs (meds, wound care, etc)  Taken 5/3/2024 0515  Discharge to home or other facility with appropriate resources:   Identify barriers to discharge with patient and caregiver   Arrange for needed discharge resources and transportation as appropriate   Identify discharge learning needs (meds, wound care, etc)     Problem: Pain  Goal: Verbalizes/displays adequate comfort level or baseline comfort level  Outcome: Progressing  Flowsheets (Taken 5/3/2024 0515)  Verbalizes/displays adequate comfort level or baseline comfort level:   Encourage patient to monitor pain and request assistance   Assess pain using appropriate pain scale   Administer analgesics based on type and severity of pain and evaluate response     Problem: Safety - Adult  Goal: Free from fall injury  Outcome: Progressing     Problem: ABCDS Injury Assessment  Goal: Absence of physical injury  Outcome: Progressing     
  Problem: Discharge Planning  Goal: Discharge to home or other facility with appropriate resources  Outcome: Progressing  Flowsheets (Taken 5/10/2024 0930)  Discharge to home or other facility with appropriate resources:   Identify barriers to discharge with patient and caregiver   Arrange for needed discharge resources and transportation as appropriate     Problem: Pain  Goal: Verbalizes/displays adequate comfort level or baseline comfort level  Outcome: Progressing     Problem: Safety - Adult  Goal: Free from fall injury  Outcome: Progressing     Problem: ABCDS Injury Assessment  Goal: Absence of physical injury  Outcome: Progressing     Problem: Skin/Tissue Integrity  Goal: Absence of new skin breakdown  Description: 1.  Monitor for areas of redness and/or skin breakdown  2.  Assess vascular access sites hourly  3.  Every 4-6 hours minimum:  Change oxygen saturation probe site  4.  Every 4-6 hours:  If on nasal continuous positive airway pressure, respiratory therapy assess nares and determine need for appliance change or resting period.  Outcome: Progressing     Problem: Hematologic - Adult  Goal: Maintains hematologic stability  Outcome: Progressing  Flowsheets (Taken 5/10/2024 0930)  Maintains hematologic stability: Assess for signs and symptoms of bleeding or hemorrhage     Problem: Nutrition Deficit:  Goal: Optimize nutritional status  Outcome: Progressing     Problem: Skin/Tissue Integrity - Adult  Goal: Skin integrity remains intact  Outcome: Progressing  Flowsheets (Taken 5/10/2024 0930)  Skin Integrity Remains Intact: Monitor for areas of redness and/or skin breakdown     
  Problem: Discharge Planning  Goal: Discharge to home or other facility with appropriate resources  Outcome: Progressing  Flowsheets (Taken 5/7/2024 1920)  Discharge to home or other facility with appropriate resources:   Identify barriers to discharge with patient and caregiver   Arrange for needed discharge resources and transportation as appropriate   Identify discharge learning needs (meds, wound care, etc)     Problem: Pain  Goal: Verbalizes/displays adequate comfort level or baseline comfort level  5/7/2024 2129 by Debbie Deutsch RN  Outcome: Progressing  Flowsheets (Taken 5/7/2024 1920)  Verbalizes/displays adequate comfort level or baseline comfort level:   Encourage patient to monitor pain and request assistance   Assess pain using appropriate pain scale   Administer analgesics based on type and severity of pain and evaluate response  5/7/2024 0857 by Janay Correa RN  Outcome: Progressing  5/7/2024 0857 by Janay Correa RN  Outcome: Progressing     Problem: Safety - Adult  Goal: Free from fall injury  5/7/2024 2129 by Debbie Deutsch RN  Outcome: Progressing  5/7/2024 0857 by Janay Correa RN  Outcome: Progressing  5/7/2024 0857 by Janay Correa RN  Outcome: Progressing     Problem: ABCDS Injury Assessment  Goal: Absence of physical injury  Outcome: Progressing     Problem: Skin/Tissue Integrity  Goal: Absence of new skin breakdown  Description: 1.  Monitor for areas of redness and/or skin breakdown  2.  Assess vascular access sites hourly  3.  Every 4-6 hours minimum:  Change oxygen saturation probe site  4.  Every 4-6 hours:  If on nasal continuous positive airway pressure, respiratory therapy assess nares and determine need for appliance change or resting period.  Outcome: Progressing     Problem: Nutrition Deficit:  Goal: Optimize nutritional status  Outcome: Progressing     Problem: Skin/Tissue Integrity - Adult  Goal: Skin integrity remains intact  Outcome: 
  Problem: Hematologic - Adult  Goal: Maintains hematologic stability  Outcome: Progressing  Flowsheets (Taken 5/4/2024 1872)  Maintains hematologic stability:   Assess for signs and symptoms of bleeding or hemorrhage   Monitor labs for bleeding or clotting disorders     
  Problem: Pain  Goal: Verbalizes/displays adequate comfort level or baseline comfort level  5/4/2024 0940 by Magaly Briuzela RN  Outcome: Progressing  5/4/2024 0939 by Magaly Brizuela RN  Outcome: Progressing  Flowsheets (Taken 5/3/2024 1931 by Shayla Varela, RN)  Verbalizes/displays adequate comfort level or baseline comfort level:   Assess pain using appropriate pain scale   Encourage patient to monitor pain and request assistance   Administer analgesics based on type and severity of pain and evaluate response   Implement non-pharmacological measures as appropriate and evaluate response   Consider cultural and social influences on pain and pain management   Notify Licensed Independent Practitioner if interventions unsuccessful or patient reports new pain     Problem: Safety - Adult  Goal: Free from fall injury  5/4/2024 0940 by Magaly Brizuela RN  Outcome: Progressing  5/4/2024 0939 by Magaly Brizuela RN  Flowsheets  Taken 5/4/2024 0939 by Magaly Brizuela RN  Free From Fall Injury: Instruct family/caregiver on patient safety  Taken 5/3/2024 2340 by Shayla Varela, RN  Free From Fall Injury: Instruct family/caregiver on patient safety     
  Problem: Pain  Goal: Verbalizes/displays adequate comfort level or baseline comfort level  5/5/2024 1135 by Magaly Brizuela RN  Outcome: Progressing  Flowsheets (Taken 5/3/2024 1931 by Shayla Varela RN)  Verbalizes/displays adequate comfort level or baseline comfort level:   Assess pain using appropriate pain scale   Encourage patient to monitor pain and request assistance   Administer analgesics based on type and severity of pain and evaluate response   Implement non-pharmacological measures as appropriate and evaluate response   Consider cultural and social influences on pain and pain management   Notify Licensed Independent Practitioner if interventions unsuccessful or patient reports new pain  5/5/2024 0000 by Beatrice Galaviz RN  Outcome: Progressing     Problem: Safety - Adult  Goal: Free from fall injury  5/5/2024 1135 by Magaly Brizuela RN  Outcome: Progressing  Flowsheets (Taken 5/5/2024 0830)  Free From Fall Injury: Instruct family/caregiver on patient safety  Note: Bed alarm on. Bed in lowest locked position. Call bell within reach. Encourage patient to call for assistance. Hourly rounding.   5/5/2024 0000 by Beatrice Galaviz RN  Outcome: Progressing     Problem: Hematologic - Adult  Goal: Maintains hematologic stability  5/5/2024 1135 by Magaly Brizuela RN  Outcome: Progressing  Flowsheets (Taken 5/4/2024 0941)  Maintains hematologic stability:   Assess for signs and symptoms of bleeding or hemorrhage   Monitor labs for bleeding or clotting disorders  5/5/2024 0000 by Beatrice Galaviz RN  Outcome: Progressing     
  Problem: Pain  Goal: Verbalizes/displays adequate comfort level or baseline comfort level  5/9/2024 2308 by Isaac Echeverria RN  Outcome: Progressing  5/9/2024 1029 by Aby Landis RN  Outcome: Progressing     Problem: Safety - Adult  Goal: Free from fall injury  5/9/2024 2308 by Isaac Echeverria RN  Outcome: Progressing  Flowsheets (Taken 5/9/2024 2000)  Free From Fall Injury: Instruct family/caregiver on patient safety  5/9/2024 1029 by Aby Landis RN  Outcome: Progressing     Problem: Discharge Planning  Goal: Discharge to home or other facility with appropriate resources  5/9/2024 2308 by Isaac Echeverria RN  Outcome: Progressing  Flowsheets (Taken 5/9/2024 2000)  Discharge to home or other facility with appropriate resources:   Identify barriers to discharge with patient and caregiver   Arrange for needed discharge resources and transportation as appropriate   Identify discharge learning needs (meds, wound care, etc)  5/9/2024 1029 by Aby Landis RN  Outcome: Progressing  Flowsheets (Taken 5/9/2024 0710)  Discharge to home or other facility with appropriate resources:   Identify barriers to discharge with patient and caregiver   Arrange for needed discharge resources and transportation as appropriate   Identify discharge learning needs (meds, wound care, etc)   Refer to discharge planning if patient needs post-hospital services based on physician order or complex needs related to functional status, cognitive ability or social support system     Problem: Skin/Tissue Integrity  Goal: Absence of new skin breakdown  Description: 1.  Monitor for areas of redness and/or skin breakdown  2.  Assess vascular access sites hourly  3.  Every 4-6 hours minimum:  Change oxygen saturation probe site  4.  Every 4-6 hours:  If on nasal continuous positive airway pressure, respiratory therapy assess nares and determine need for appliance change or resting period.  5/9/2024 2308 by Isaac Echeverria RN  Outcome: 
  Problem: Pain  Goal: Verbalizes/displays adequate comfort level or baseline comfort level  Outcome: Progressing     Problem: Safety - Adult  Goal: Free from fall injury  Outcome: Progressing     Problem: Skin/Tissue Integrity  Goal: Absence of new skin breakdown  Description: 1.  Monitor for areas of redness and/or skin breakdown  2.  Assess vascular access sites hourly  3.  Every 4-6 hours minimum:  Change oxygen saturation probe site  4.  Every 4-6 hours:  If on nasal continuous positive airway pressure, respiratory therapy assess nares and determine need for appliance change or resting period.  Outcome: Progressing     Problem: Nutrition Deficit:  Goal: Optimize nutritional status  Outcome: Progressing     
  Problem: Pain  Goal: Verbalizes/displays adequate comfort level or baseline comfort level  Outcome: Progressing  Flowsheets (Taken 5/3/2024 1931 by Shayla Varela, RN)  Verbalizes/displays adequate comfort level or baseline comfort level:   Assess pain using appropriate pain scale   Encourage patient to monitor pain and request assistance   Administer analgesics based on type and severity of pain and evaluate response   Implement non-pharmacological measures as appropriate and evaluate response   Consider cultural and social influences on pain and pain management   Notify Licensed Independent Practitioner if interventions unsuccessful or patient reports new pain     Problem: Hematologic - Adult  Goal: Maintains hematologic stability  Outcome: Progressing  Flowsheets (Taken 5/5/2024 1945 by Anahi Santos, RN)  Maintains hematologic stability:   Assess for signs and symptoms of bleeding or hemorrhage   Monitor labs for bleeding or clotting disorders   Administer blood products/factors as ordered     Problem: Skin/Tissue Integrity - Adult  Goal: Skin integrity remains intact  Outcome: Progressing  Flowsheets (Taken 5/5/2024 1945 by Anahi Santos, RN)  Skin Integrity Remains Intact:   Monitor for areas of redness and/or skin breakdown   Assess vascular access sites hourly   Every 4-6 hours minimum: Change oxygen saturation probe site   Every 4-6 hours: If on nasal continuous positive airway pressure, respiratory therapy assesses nares and determine need for appliance change or resting period     
Nutrition Assessment     Type and Reason for Visit: Reassess    Nutrition Recommendations/Plan:   Continue regular diet order  Continue standard high calorie/high protein supplement, providing 350kcal and 20g protein each  Monitor weights, labs, PO intake and plan of care while admitted     Malnutrition Assessment:  Malnutrition Status: At risk for malnutrition (Comment) (self reported 8lb wt loss x 3 wks;)    Nutrition Assessment:  Per documentation, diagnosis likely renal cell carcinoma; waiting for immunostatins to determine where cancer started. PO intake variable, ranging from 0-100%, with most entries 1-25%. No documented weight loss in EMR. Nausea documented today.    Estimated Daily Nutrient Needs:  Energy (kcal):  8363-6443 (MSJ 1.1-1.2) Weight Used for Energy Requirements: Current     Protein (g):  71-89 (1.2-1.5g/kg cbw) Weight Used for Protein Requirements: Ideal        Fluid (ml/day):  or per MD Method Used for Fluid Requirements: 1 ml/kcal    Nutrition Related Findings:   Last BM 5/2 ? No edema. Labs and meds reviewed. Wound Type: None    Current Nutrition Therapies:    ADULT DIET; Regular  ADULT ORAL NUTRITION SUPPLEMENT; Breakfast, Lunch, Dinner, HS Snack; Standard High Calorie/High Protein Oral Supplement    Anthropometric Measures:  Height: 167.6 cm (5' 6\")  Current Body Wt: 90.3 kg (199 lb)   BMI: 32.1    Nutrition Diagnosis:   Increased nutrient needs related to increase demand for energy/nutrients as evidenced by other (comment) (renal cell carcinoma; decreased PO intake)    Nutrition Interventions:   Food and/or Nutrient Delivery: Continue Current Diet, Continue Oral Nutrition Supplement  Nutrition Education/Counseling: No recommendation at this time  Coordination of Nutrition Care: Continue to monitor while inpatient       Goals:  Previous Goal Met: No Progress toward Goal(s)  Goals: Meet at least 75% of estimated needs, by next RD assessment       Nutrition Monitoring and Evaluation: 
30.0-34.9)    Estimated Daily Nutrient Needs:  Energy Requirements Based On: Formula  Weight Used for Energy Requirements: Current  Energy (kcal/day): 8494-4765 (MSJ 1.1-1.2)  Weight Used for Protein Requirements: Ideal  Protein (g/day): 71-89 (1.2-1.5g/kg IBW)  Method Used for Fluid Requirements: 1 ml/kcal  Fluid (ml/day): 5842-9733    Nutrition Diagnosis:   Unintended weight loss related to catabolic illness as evidenced by other (comment) (newly diagnosed catabolic illness with increased weakness and decreased PO Intake)    Nutrition Interventions:   Food and/or Nutrient Delivery: Continue Current Diet  Nutrition Education/Counseling: No recommendation at this time  Coordination of Nutrition Care: Continue to monitor while inpatient       Goals:  Previous Goal Met: Progressing toward Goal(s)  Goals: Meet at least 75% of estimated needs, by next RD assessment       Nutrition Monitoring and Evaluation:   Behavioral-Environmental Outcomes: None Identified  Food/Nutrient Intake Outcomes: Food and Nutrient Intake  Physical Signs/Symptoms Outcomes: Biochemical Data, Hemodynamic Status, Meal Time Behavior, Nutrition Focused Physical Findings, Weight    Discharge Planning:    Too soon to determine     Gladys Machado RD  Contact: [phone]    
Integrity - Adult  Goal: Skin integrity remains intact  5/10/2024 2153 by Herber Paul RN  Outcome: Progressing  5/10/2024 1658 by Leticia Mathews RN  Outcome: Progressing  Flowsheets (Taken 5/10/2024 0930)  Skin Integrity Remains Intact: Monitor for areas of redness and/or skin breakdown     Problem: Nutrition Deficit:  Goal: Optimize nutritional status  5/10/2024 2153 by Herber Paul, RN  Outcome: Progressing  5/10/2024 1658 by Leticia Mathews RN  Outcome: Progressing     
RN  Outcome: Progressing  5/5/2024 1135 by Magaly Brizuela, RN  Outcome: Progressing  Flowsheets (Taken 5/4/2024 0941)  Maintains hematologic stability:   Assess for signs and symptoms of bleeding or hemorrhage   Monitor labs for bleeding or clotting disorders     Problem: Nutrition Deficit:  Goal: Optimize nutritional status  Outcome: Progressing     
change or resting period.  5/11/2024 1022 by Natacha Chandra RN  Outcome: Progressing  5/10/2024 2153 by Herber Paul RN  Outcome: Progressing     Problem: Hematologic - Adult  Goal: Maintains hematologic stability  5/11/2024 1022 by Natacha Chandra RN  Outcome: Progressing  Flowsheets (Taken 5/11/2024 0800)  Maintains hematologic stability:   Monitor labs for bleeding or clotting disorders   Assess for signs and symptoms of bleeding or hemorrhage   Administer blood products/factors as ordered  5/10/2024 2153 by Herber Paul RN  Outcome: Progressing     Problem: Nutrition Deficit:  Goal: Optimize nutritional status  5/11/2024 1022 by Natacha Chandra RN  Outcome: Progressing  5/10/2024 2153 by Herber Paul RN  Outcome: Progressing     Problem: Skin/Tissue Integrity - Adult  Goal: Skin integrity remains intact  5/11/2024 1022 by Natacha Chandra RN  Outcome: Progressing  Flowsheets (Taken 5/11/2024 0800)  Skin Integrity Remains Intact: Monitor for areas of redness and/or skin breakdown  5/10/2024 2153 by Herber Paul, RN  Outcome: Progressing

## 2024-05-13 VITALS
HEIGHT: 66 IN | WEIGHT: 199.52 LBS | DIASTOLIC BLOOD PRESSURE: 66 MMHG | BODY MASS INDEX: 32.06 KG/M2 | RESPIRATION RATE: 18 BRPM | SYSTOLIC BLOOD PRESSURE: 102 MMHG | OXYGEN SATURATION: 94 % | TEMPERATURE: 97.7 F | HEART RATE: 96 BPM

## 2024-05-13 LAB
ALBUMIN SERPL-MCNC: 2 G/DL (ref 3.4–5)
ALBUMIN/GLOB SERPL: 0.5 (ref 0.8–1.7)
ALP SERPL-CCNC: 134 U/L (ref 45–117)
ALT SERPL-CCNC: 17 U/L (ref 13–56)
ANION GAP SERPL CALC-SCNC: 7 MMOL/L (ref 3–18)
AST SERPL-CCNC: 22 U/L (ref 10–38)
BASOPHILS # BLD: 0.1 K/UL (ref 0–0.1)
BASOPHILS NFR BLD: 1 % (ref 0–2)
BILIRUB SERPL-MCNC: 0.4 MG/DL (ref 0.2–1)
BUN SERPL-MCNC: 11 MG/DL (ref 7–18)
BUN/CREAT SERPL: 14 (ref 12–20)
CALCIUM SERPL-MCNC: 8.7 MG/DL (ref 8.5–10.1)
CHLORIDE SERPL-SCNC: 102 MMOL/L (ref 100–111)
CO2 SERPL-SCNC: 29 MMOL/L (ref 21–32)
CREAT SERPL-MCNC: 0.77 MG/DL (ref 0.6–1.3)
DIFFERENTIAL METHOD BLD: ABNORMAL
EOSINOPHIL # BLD: 0.1 K/UL (ref 0–0.4)
EOSINOPHIL NFR BLD: 1 % (ref 0–5)
ERYTHROCYTE [DISTWIDTH] IN BLOOD BY AUTOMATED COUNT: 14.7 % (ref 11.6–14.5)
GLOBULIN SER CALC-MCNC: 3.9 G/DL (ref 2–4)
GLUCOSE SERPL-MCNC: 91 MG/DL (ref 74–99)
HCT VFR BLD AUTO: 27.9 % (ref 35–45)
HGB BLD-MCNC: 8.5 G/DL (ref 12–16)
IMM GRANULOCYTES # BLD AUTO: 0.2 K/UL (ref 0–0.04)
IMM GRANULOCYTES NFR BLD AUTO: 2 % (ref 0–0.5)
LYMPHOCYTES # BLD: 1 K/UL (ref 0.9–3.6)
LYMPHOCYTES NFR BLD: 14 % (ref 21–52)
MCH RBC QN AUTO: 25.1 PG (ref 24–34)
MCHC RBC AUTO-ENTMCNC: 30.5 G/DL (ref 31–37)
MCV RBC AUTO: 82.5 FL (ref 78–100)
MONOCYTES # BLD: 0.9 K/UL (ref 0.05–1.2)
MONOCYTES NFR BLD: 12 % (ref 3–10)
NEUTS SEG # BLD: 5.2 K/UL (ref 1.8–8)
NEUTS SEG NFR BLD: 70 % (ref 40–73)
NRBC # BLD: 0 K/UL (ref 0–0.01)
NRBC BLD-RTO: 0 PER 100 WBC
PLATELET # BLD AUTO: 256 K/UL (ref 135–420)
PMV BLD AUTO: 10.3 FL (ref 9.2–11.8)
POTASSIUM SERPL-SCNC: 3.5 MMOL/L (ref 3.5–5.5)
PROT SERPL-MCNC: 5.9 G/DL (ref 6.4–8.2)
RBC # BLD AUTO: 3.38 M/UL (ref 4.2–5.3)
SODIUM SERPL-SCNC: 138 MMOL/L (ref 136–145)
WBC # BLD AUTO: 7.4 K/UL (ref 4.6–13.2)

## 2024-05-13 PROCEDURE — 6370000000 HC RX 637 (ALT 250 FOR IP): Performed by: INTERNAL MEDICINE

## 2024-05-13 PROCEDURE — 36415 COLL VENOUS BLD VENIPUNCTURE: CPT

## 2024-05-13 PROCEDURE — 80053 COMPREHEN METABOLIC PANEL: CPT

## 2024-05-13 PROCEDURE — 2580000003 HC RX 258: Performed by: INTERNAL MEDICINE

## 2024-05-13 PROCEDURE — 6370000000 HC RX 637 (ALT 250 FOR IP): Performed by: HOSPITALIST

## 2024-05-13 PROCEDURE — 85025 COMPLETE CBC W/AUTO DIFF WBC: CPT

## 2024-05-13 PROCEDURE — 6360000002 HC RX W HCPCS: Performed by: STUDENT IN AN ORGANIZED HEALTH CARE EDUCATION/TRAINING PROGRAM

## 2024-05-13 RX ORDER — ROSUVASTATIN CALCIUM 10 MG/1
10 TABLET, COATED ORAL NIGHTLY
Qty: 30 TABLET | Refills: 3 | Status: SHIPPED | OUTPATIENT
Start: 2024-05-13

## 2024-05-13 RX ADMIN — OXYCODONE HYDROCHLORIDE AND ACETAMINOPHEN 1 TABLET: 5; 325 TABLET ORAL at 10:40

## 2024-05-13 RX ADMIN — DOCUSATE SODIUM 100 MG: 100 CAPSULE, LIQUID FILLED ORAL at 07:48

## 2024-05-13 RX ADMIN — THYROID 60 MG: 30 TABLET ORAL at 07:48

## 2024-05-13 RX ADMIN — ONDANSETRON 4 MG: 2 INJECTION INTRAMUSCULAR; INTRAVENOUS at 10:40

## 2024-05-13 RX ADMIN — SODIUM CHLORIDE, PRESERVATIVE FREE 10 ML: 5 INJECTION INTRAVENOUS at 07:54

## 2024-05-13 RX ADMIN — PANTOPRAZOLE SODIUM 40 MG: 40 TABLET, DELAYED RELEASE ORAL at 07:48

## 2024-05-13 RX ADMIN — POLYETHYLENE GLYCOL 3350 17 G: 17 POWDER, FOR SOLUTION ORAL at 07:48

## 2024-05-13 ASSESSMENT — PAIN DESCRIPTION - DESCRIPTORS: DESCRIPTORS: THROBBING

## 2024-05-13 ASSESSMENT — PAIN SCALES - GENERAL
PAINLEVEL_OUTOF10: 2
PAINLEVEL_OUTOF10: 6
PAINLEVEL_OUTOF10: 0

## 2024-05-13 ASSESSMENT — PAIN DESCRIPTION - LOCATION: LOCATION: HIP

## 2024-05-13 ASSESSMENT — PAIN DESCRIPTION - ONSET: ONSET: SUDDEN

## 2024-05-13 ASSESSMENT — PAIN DESCRIPTION - FREQUENCY: FREQUENCY: INTERMITTENT

## 2024-05-13 ASSESSMENT — PAIN DESCRIPTION - PAIN TYPE: TYPE: CHRONIC PAIN

## 2024-05-13 ASSESSMENT — PAIN DESCRIPTION - ORIENTATION: ORIENTATION: RIGHT

## 2024-05-13 ASSESSMENT — PAIN - FUNCTIONAL ASSESSMENT: PAIN_FUNCTIONAL_ASSESSMENT: PREVENTS OR INTERFERES SOME ACTIVE ACTIVITIES AND ADLS

## 2024-05-13 NOTE — PROGRESS NOTES
Attempted to see patient but patient is off floor.  Chart reviewed, events noted.  Most concerning for lung cancer or other metastasis   Ordered CT guided biopsy of the right anterior 1st rib mass.  Ordered zometa.   CT abd/pelvis has been ordered by primary team already.    My partner will see patient with full consultation note tomorrow.    Juan Pablo Paiz MD, PhD, FACP  [facility]    
  Hospitalist Progress Note    Patient: Madina Schafer MRN: [id number]  CSN: [id number]    YOB: 1955  Age: 69 y.o.  Sex: female    DOA: 5/2/2024 LOS:  LOS: 9 days            Patient Active Problem List   Diagnosis    Diarrhea    GERD (gastroesophageal reflux disease)    Stented coronary artery    Hypercalcemia    Metastatic cancer to bone (HCC)    Adrenal nodule (HCC)    Pulmonary nodule    UTI (urinary tract infection)    Renal mass    Deep vein thrombosis (DVT) of both lower extremities (HCC)    Pleural effusion    Renal cell carcinoma (HCC)        Active Hospital Problems    Diagnosis     Renal cell carcinoma (HCC) [C64.9]     Pleural effusion [J90]     Renal mass [N28.89]     Deep vein thrombosis (DVT) of both lower extremities (HCC) [I82.403]     Hypercalcemia [E83.52]     Metastatic cancer to bone (HCC) [C79.51]     Adrenal nodule (HCC) [E27.8]     Pulmonary nodule [R91.1]     UTI (urinary tract infection) [N39.0]          IMPRESSION and Plan:   Active Hospital Problems     Diagnosis Date Noted    Renal cell carcinoma (HCC) [C64.9] 05/10/2024    Pleural effusion [J90] 05/08/2024    Renal mass [N28.89] 05/06/2024    Deep vein thrombosis (DVT) of both lower extremities (HCC) [I82.403] 05/06/2024    Hypercalcemia [E83.52] 05/03/2024    Metastatic cancer to bone (HCC) [C79.51] 05/03/2024    Adrenal nodule (HCC) [E27.8] 05/03/2024    Pulmonary nodule [R91.1] 05/03/2024    UTI (urinary tract infection) [N39.0] 05/03/2024         Hypoxia  - wean off as tolered  Continue levaquine to complete the abx   Continue  lasix due to known pleural effusion     Continue weaning off nc oxygen      Hypercalcemia- ca now stable  Received zometa on admission      Pleural effusion  IR consulted -not enough fluid to remove   On lasix now      Metastatic lesions, renal mass -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 
  Hospitalist Progress Note    Patient: Madina Schafer MRN: [id number]  CSN: [id number]    YOB: 1955  Age: 69 y.o.  Sex: female    DOA: 5/2/2024 LOS:  LOS: 2 days                Assessment and Plan:    Principal Problem:    Hypercalcemia  Active Problems:    Metastatic cancer to bone (HCC)    Adrenal nodule (HCC)    Pulmonary nodule    UTI (urinary tract infection)  Resolved Problems:    * No resolved hospital problems. *      New onset malignancy: ct biopsy on Monday    DVT: on Heparin gtt . Will need to go home on NOAC  CAD: on crestor  UTI: on ceftriaxone. Growing proteus and enterococcus and should be sensitive  Watching calcium ,She is now hypocalcemic   will stop fluids    Chief complaint:  Weakness  for 2 weeks and worse today      Subjective:    Feeling a little better but still weak       Review of systems:    General: No fevers or chills.  Cardiovascular: No chest pain or pressure. No palpitations.   Pulmonary: No shortness of breath.   Gastrointestinal: No nausea, vomiting.     Objective:    Vital signs/Intake and Output:  Visit Vitals  /82   Pulse 97   Temp 98.9 °F (37.2 °C) (Temporal)   Resp 18   Ht 1.676 m (5' 6\")   Wt 90.5 kg (199 lb 8.3 oz)   SpO2 95%   BMI 32.20 kg/m²     Current Shift:  No intake/output data recorded.  Last three shifts:  05/03 1901 - 05/05 0700  In: 3772.2 [P.O.:600; I.V.:3122.2]  Out: 1600 [Urine:1600]    Physical Exam:  General: NAD, AAOx3. Non-toxic.  HEENT: NC/AT.  PERRLA, EOMI.  MMM.  Lungs: Nml inspection. CTA B/L. No wheezing, rales or rhonchi.   Heart:  S1S2 RRR,  PMI mid 5th IC space. No M/RG.  Abdomen: Soft, NT/ND.  BS+. No peritoneal signs.  Extremities: No C/C/E.  Psych:   Nml affect.  Neurologic:  2-12 intact.  Strength 5/5 throughout.  Sensation symmetrical.          Labs: Results:       Chemistry Recent Labs     05/03/24  0430 05/04/24  0245 05/05/24  0427    137 139   K 3.8 3.7 3.4*    106 109   CO2 27 24 23   BUN 10 7 7   GLOB 3.4 
  Hospitalist Progress Note-critical care note     Patient: Madina Schafer MRN: [id number]  CSN: [id number]    YOB: 1955  Age: 69 y.o.  Sex: female    DOA: 5/2/2024 LOS:  LOS: 6 days            Chief complaint: renal mass , hypercalcemia, uti , anemia, dvt uti     Assessment/Plan         Active Hospital Problems    Diagnosis Date Noted    Pleural effusion [J90] 05/08/2024    Renal mass [N28.89] 05/06/2024    Deep vein thrombosis (DVT) of both lower extremities (HCC) [I82.403] 05/06/2024    Hypercalcemia [E83.52] 05/03/2024    Metastatic cancer to bone (HCC) [C79.51] 05/03/2024    Adrenal nodule (HCC) [E27.8] 05/03/2024    Pulmonary nodule [R91.1] 05/03/2024    UTI (urinary tract infection) [N39.0] 05/03/2024        Hypoxia continue weaning off oxygen and also bedside incentive spirometry-pt still dose not have it at the bedside at this moment   Continue levaquine  days   Continue  lasix due to known pleural effusion  Also incentive spirometry    Continue weaning off nc oxygen     Hypercalcemia-calcium low now on tums  8.5 now ,continue monitoring hold sammy now continue monitoring   Received zometa on admission      Pleural effusion  IR consulted -not enough fluid to remove   On lasix now      Metastatic lesions, renal mass -primary report METASTATIC CARCINOMA   Hematology consult  Ct guided biopsy per IR        UTI  Cx Enterococcus faecalis  Proteus mirabilis (PREDOMINATING)   On  Levaquin   Diflucan prn     CAD s/p sent   Echo ef wnl cardiologist seeing pt no further work up needed      HTN  Hydralazine prn     GERD  Protonix start    Anemia so far hh stable     Dvt on xarelto 20 mg per oncologist recommendation     Subjective  I still weak, still can not going home,     TIME: E/M Time spent with patient and patient care issues: [] 31-40 mins  [x] 41-49 mins  [] 50 mins or more.      Disposition :tbd,   Review of systems:    General: No fevers or chills.  Cardiovascular: No chest pain or pressure. No 
  Hospitalist Progress Note-critical care note     Patient: Madina Schafer MRN: [id number]  CSN: [id number]    YOB: 1955  Age: 69 y.o.  Sex: female    DOA: 5/2/2024 LOS:  LOS: 5 days            Chief complaint: renal mass , hypercalcemia, uti , anemia, dvt uti     Assessment/Plan         Active Hospital Problems    Diagnosis Date Noted    Renal mass [N28.89] 05/06/2024    Deep vein thrombosis (DVT) of both lower extremities (HCC) [I82.403] 05/06/2024    Hypercalcemia [E83.52] 05/03/2024    Metastatic cancer to bone (HCC) [C79.51] 05/03/2024    Adrenal nodule (HCC) [E27.8] 05/03/2024    Pulmonary nodule [R91.1] 05/03/2024    UTI (urinary tract infection) [N39.0] 05/03/2024        Hypoxia continue weaning off oxygen and also bedside incentive spirometry-pt still dose not have it at the bedside at this moment   Continue levaquine  days   Continue  lasix due to known pleural effusion  Also incentive spirometry      Hypercalcemia-calcium low now on tums  8.3 now ,continue monitoring   Received zometa on admission      Pleural effusion  IR consulted -not enough fluid to remove   On lasix now      Metastatic lesions, renal mass   Hematology consult  Ct guided biopsy per IR today        UTI  Cx Enterococcus faecalis  Proteus mirabilis (PREDOMINATING)   On  Levaquin   Diflucan prn     CAD s/p sent   Echo ef wnl cardiologist seeing pt no further work up needed      HTN  Hydralazine prn     GERD  Protonix start    Anemia so far hh stable     Dvt on xarelto 20 mg per oncologist recommendation     Subjective  no one bring me spirometry, my lungs not open , I feel fine if not moving     Waiting for the path results and treat for pna and pleural effusion     TIME: E/M Time spent with patient and patient care issues: [] 31-40 mins  [x] 41-49 mins  [] 50 mins or more.      Disposition :tbd,   Review of systems:    General: No fevers or chills.  Cardiovascular: No chest pain or pressure. No palpitations.   Pulmonary: 
  Hospitalist Progress Note-critical care note     Patient: Madina Schafer MRN: [id number]  CSN: [id number]    YOB: 1955  Age: 69 y.o.  Sex: female    DOA: 5/2/2024 LOS:  LOS: 7 days            Chief complaint: renal mass , hypercalcemia, uti , anemia, dvt uti     Assessment/Plan         Active Hospital Problems    Diagnosis Date Noted    Renal cell carcinoma (HCC) [C64.9] 05/10/2024    Pleural effusion [J90] 05/08/2024    Renal mass [N28.89] 05/06/2024    Deep vein thrombosis (DVT) of both lower extremities (HCC) [I82.403] 05/06/2024    Hypercalcemia [E83.52] 05/03/2024    Metastatic cancer to bone (HCC) [C79.51] 05/03/2024    Adrenal nodule (HCC) [E27.8] 05/03/2024    Pulmonary nodule [R91.1] 05/03/2024    UTI (urinary tract infection) [N39.0] 05/03/2024        Hypoxia continue weaning off oxygen and also bedside incentive spirometry-pt still dose not have it at the bedside at this moment   Continue levaquine to complete the abx   Continue  lasix due to known pleural effusion  Also incentive spirometry    Continue weaning off nc oxygen     Hypercalcemia-calcium low now on tums  8.6 now ,continue monitoring hold sammy now continue monitoring   Received zometa on admission      Pleural effusion  IR consulted -not enough fluid to remove   On lasix now      Metastatic lesions, renal mass -primary report METASTATIC CARCINOMA   Dr. Mason is working on chemo meds   Ct guided biopsy per IR        UTI  Cx Enterococcus faecalis  Proteus mirabilis (PREDOMINATING)   On  Levaquin till complete tx   Diflucan prn     CAD s/p sent   Echo ef wnl cardiologist seeing pt no further work up needed      HTN  Hydralazine prn     GERD  Protonix start    Anemia so far hh stable     Dvt on xarelto 20 mg per oncologist recommendation     Subjective  I feel fine no nausea     Kub stool burden-add miralax     TIME: E/M Time spent with patient and patient care issues: [] 31-40 mins  [x] 41-49 mins  [] 50 mins or more.    
  Physical Therapy Goals:  Initiated 5/10/2024 to be met within 7-10 days.  Short Term Goals  Short Term Goal 1: Patient will perform supine to/from sit with supervision.  Short Term Goal 2: Patient will perform sit to/from stand with S/RW in prep for ambulation activity.  Short Term Goal 3: Patient will perform ambulation 50 ft with S/RW for increased functional mobility.  Short Term Goal 4: Patient will perform step nego x3-5 with min/CGA and HR for home re-entry.    []  Patient has met MD eli carlin for d/c home   []  Recommend HH with 24 hour adult care   [x]  Benefit from additional acute PT session to address:        PHYSICAL THERAPY TREATMENT    Patient: Madina Schafer (69 y.o. female)  Date: 5/10/2024  Diagnosis: Hypercalcemia [E83.52]  Metastatic cancer to bone (HCC) [C79.51]  Adrenal nodule (HCC) [E27.8]  Generalized weakness [R53.1]  Stented coronary artery [Z95.5]  Unable to walk [R26.2]  Multiple lung nodules [R91.8]  Anemia, unspecified type [D64.9] Hypercalcemia      Precautions: Fall Risk,  ,  ,  ,  ,  ,  ,    PLOF: ambulatory with a cane, lives alone    ASSESSMENT:  Assessment  Assessment: Pt in bed, friend present.  ModA and increased time needed to sit up EOB. Dizziness is improving but still present.  Yokasta for sit to stand.  Ambulated 20ft with RW, decreased step height and length, decreased pace, no LOB, dizziness increasing with nausea present.  Returned to sitting EOB, mod/maxA with LEs into supine.  Mod/maxA to scoot up to HOB.  Activity Tolerance: Patient tolerated treatment well;Patient limited by pain  Discharge Recommendations: Subacute/Skilled Nursing Facility    Progression toward goals:   []      Improving appropriately and progressing toward goals  [x]      Improving slowly and progressing toward goals  []      Not making progress toward goals and plan of care will be adjusted     PLAN:  Patient continues to benefit from skilled intervention to address the above 
  Physical Therapy Goals:  Initiated 5/7/2024 to be met within 7-10 days.  Short Term Goals  Short Term Goal 1: Patient will perform supine to/from sit with supervision.  Short Term Goal 2: Patient will perform sit to/from stand with S/RW in prep for ambulation activity.  Short Term Goal 3: Patient will perform ambulation 50 ft with S/RW for increased functional mobility.  Short Term Goal 4: Patient will perform step nego x3-5 with min/CGA and HR for home re-entry.    []  Patient has met MD eli carlin for d/c home   []  Recommend HH with 24 hour adult care   [x]  Benefit from additional acute PT session to address:  ambulation, rehab placement      PHYSICAL THERAPY TREATMENT    Patient: Madina Schafer (69 y.o. female)  Date: 5/7/2024  Diagnosis: Hypercalcemia [E83.52]  Metastatic cancer to bone (HCC) [C79.51]  Adrenal nodule (HCC) [E27.8]  Generalized weakness [R53.1]  Stented coronary artery [Z95.5]  Unable to walk [R26.2]  Multiple lung nodules [R91.8]  Anemia, unspecified type [D64.9] Hypercalcemia      Precautions: Fall Risk,  ,  ,  ,  ,  ,  ,    PLOF: ambulatory with a cane before admission    ASSESSMENT:  Assessment  Assessment: Pt in bed upon arrival.  Increased time and modA to sit up EOB with HOB elevated and use of bed rail.  Increased difficulty moving hips to EOB.  Elevated bed to assist with sit to stand.  Very brief moment with pt placing feet on the floor and did not clear buttocks from mattress before needing to stop due increasing dizziness and nausea.  Pt able to assist with scooting laterally to bed rail then maxA with LEs into supine.  Pt starting to feel better in supine.  Educated on increasing HOB in increments throughout the day to increase tolerance to change in position.  Activity Tolerance: Other (comment)  Discharge Recommendations: Subacute/Skilled Nursing Facility    Progression toward goals:   []      Improving appropriately and progressing toward goals  [x]      
  Physical Therapy Goals:  Initiated 5/8/2024 to be met within 7-10 days.  Short Term Goals  Short Term Goal 1: Patient will perform supine to/from sit with supervision.  Short Term Goal 2: Patient will perform sit to/from stand with S/RW in prep for ambulation activity.  Short Term Goal 3: Patient will perform ambulation 50 ft with S/RW for increased functional mobility.  Short Term Goal 4: Patient will perform step nego x3-5 with min/CGA and HR for home re-entry.    []  Patient has met MD eli carlin for d/c home   []  Recommend HH with 24 hour adult care   [x]  Benefit from additional acute PT session to address:  cont increasing tolerance for OOB activity, ambulation, stairs, exercises, rehab placement      PHYSICAL THERAPY TREATMENT    Patient: Madina Schafer (69 y.o. female)  Date: 5/8/2024  Diagnosis: Hypercalcemia [E83.52]  Metastatic cancer to bone (HCC) [C79.51]  Adrenal nodule (HCC) [E27.8]  Generalized weakness [R53.1]  Stented coronary artery [Z95.5]  Unable to walk [R26.2]  Multiple lung nodules [R91.8]  Anemia, unspecified type [D64.9] Hypercalcemia      Precautions: Fall Risk,  ,  ,  ,  ,  ,  ,    PLOF: lives alone, ambulatory with a cane    ASSESSMENT:  Assessment  Assessment: Pt in bed, HOB elevated and friend present.  Nurse and student arrived at beginning of session to disconnect IV.  Pt reports new IV is sore and is limited with using that hand.  ModA to sit up EOB.  Reports dizziness initially upon sitting up that subsided with time.  Yokasta for sit to stand.  Pt able to take forward/backward steps away from the bed with RW, 6ft, decreased step height and length on (B)LEs, no LOB.  Returned to sitting due to pt c/o lightheadedness.  Did not improve with sitting so assisted pt back to supine with mod/maxA with LEs.  Bed placed in trendelenburg to scoot up to HOB.  Elevated HOB in increments to comfortable height without dizziness.  Instructed to perform ankle pumps.  Rehab placement 
  Physician Progress Note      PATIENT:               DESIRAE NORIEGA  CSN #:                  [id number]  :                       1955  ADMIT DATE:       2024 11:21 PM  DISCH DATE:  RESPONDING  PROVIDER #:        April Schwartz MD          QUERY TEXT:    Patient admitted with mets, noted to have pleural effusion. If possible,   please document in progress notes and d/c summary further specificity   regarding the type/underlying cause of pleural effusion:      The medical record reflects the following:  Risk Factors: lung nodules  Clinical Indicators:   oncology note  CT of the chest shows several lung nodules with scattered bony lesions   consistent with metastatic disease.  Small pleural effusion with adjacent   atelectasis.  Left adrenal nodule 1.6 cm could represent metastasis.    Treatment: tap, oncology consult    Thank you  Suzi Ellsworth RN, CDI, CCDS, CRCR  Certified  Clinical Documentation   O: [phone]  [email or web address]  I can also be reached by perfect serve  Options provided:  -- Malignant pleural effusion  -- Other - I will add my own diagnosis  -- Disagree - Not applicable / Not valid  -- Disagree - Clinically unable to determine / Unknown  -- Refer to Clinical Documentation Reviewer    PROVIDER RESPONSE TEXT:    Provider is clinically unable to determine a response to this query.  pathologist pending and not sure the etiology of pleural effusion    Query created by: Suzi Ellsworth on 2024 7:47 AM      Electronically signed by:  April Schwartz MD 2024 11:04 AM          
 LOGO HERE    [facility]  Phone: [phone]  Paging : ([place]) [phone] (Hospital) [phone]   ([place]) [phone] (Hospital) [phone]      Hematology / Oncology Progress Note         Assessment:   Hypercalcemia  Active Problems:    Metastatic cancer to bone (HCC)    Adrenal nodule (HCC)    Pulmonary nodule    UTI (urinary tract infection)  Weakness  CAD   Chronic CAD mid LAD post PCI in 2020   DVT  Hypertension   Bilateral small pleural effusion   Hypercalcemia   Pulmonary nodule  UTI  GERD  metastatic cancer    Plan:   CT of the chest shows several lung nodules with scattered bony lesions consistent with metastatic disease.  Small pleural effusion with adjacent atelectasis.  Left adrenal nodule 1.6 cm could represent metastasis.   CT of the head negative for metastasis   large right renal mass measuring 9.1 x 9.3 by 3 1.3 cm.  Extensive osseous metastatic disease greatest in the right sacrum and right posterior iliac bones.  Increased size of small bilateral pleural effusions.  Bilateral basilar subsegmental atelectasis.  Small amount of free intrapelvic fluid.  The patient is post hysterectomy.  Likely renal cell carcinoma.  CT biopsy today for diagnosis  Hypercalcemia on admission treated with biphosphonate Zometa  Symptomatic anemia WBCs 4.9 hemoglobin 8 platelets 290  Ferritin 337 iron saturation only 5% consistent with anemia of chronic disease  Urinary tract infection with Enterococcus faecalis and Proteus mirabilis on Rocephin.  Rocephin is usually not sensitive to Enterococcus.  Sensitivities are pending  9.  Biopsy today  10.  PVL shows nonocclusive DVT of the left common femoral vein, nonocclusive superficial venous thrombosis of the left saphenofemoral junction no evidence of a DVT on the right.  Currently on heparin.      Admit Date: 5/2/2024    Subjective:     The patient continues to complain of weakness.  No fever or chills.  No mental status changes.  For 
 LOGO HERE    [facility]  Phone: [phone]  Paging : ([place]) [phone] (Hospital) [phone]   ([place]) [phone] (Hospital) [phone]      Hematology / Oncology Progress Note         Assessment:     Hypercalcemia  Active Problems:    Metastatic cancer to bone (HCC)    Adrenal nodule (HCC)    Pulmonary nodule    UTI (urinary tract infection)  Weakness  CAD   Chronic CAD mid LAD post PCI in 2020   DVT  Hypertension   Bilateral small pleural effusion   Hypercalcemia   Pulmonary nodule  UTI  GERD  metastatic cancer       Plan:     1.  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  Recommend treatment with Keytruda and either axitinib or Lenvima.  Will see which 1 has the most favorable cost and arrange for that medication.  Keytruda will be given every 6 weeks intravenously.  The Lenvima would be 20 mg a day.  The axitinib would be starting out at 5 mg twice a day monitor blood pressure after 2 weeks increase to 7 mg twice daily after another 2 to 4 weeks 10 mg twice daily.    3  There are no decisions from the patient's part as to what she is going to do.  It looks like she is going home at least for sometime with aides and home care then she wants to be placed in the facility.  It is not clear what facility she is going to go to.  She has not been excepted by anyone.  3.  I needed decision to start therapy.      CT of the chest shows several lung nodules with scattered bony lesions consistent with metastatic disease.  Small pleural effusion with adjacent atelectasis.  Left adrenal nodule 1.6 cm could represent metastasis.   CT of the head negative for metastasis   large right renal mass measuring 9.1 x 9.3 by 3 1.3 cm.  Extensive osseous metastatic disease greatest in the right sacrum and right posterior iliac bones.  Increased size of small bilateral pleural effusions.  Bilateral basilar subsegmental 
 LOGO HERE    [facility]  Phone: [phone]  Paging : ([place]) [phone] (Hospital) [phone]   ([place]) [phone] (Hospital) [phone]      Hematology / Oncology Progress Note         Assessment:     Hypercalcemia  Active Problems:    Metastatic cancer to bone (HCC)    Adrenal nodule (HCC)    Pulmonary nodule    UTI (urinary tract infection)  Weakness  CAD   Chronic CAD mid LAD post PCI in 2020   DVT  Hypertension   Bilateral small pleural effusion   Hypercalcemia   Pulmonary nodule  UTI  GERD  metastatic cancer       Plan:     1.  Preliminary pathology positive for carcinoma.  Awaiting for immunostains to identify the disease process.  Likely renal cell carcinoma.  2.  Patient continues to work with physical therapy and Occupational Therapy.  Still very weak and unsteady on her feet.  She is making some slow progress.  3.  Sacral biopsy shows carcinoma immunostains are pending to determine etiology of the cancer.     CT of the chest shows several lung nodules with scattered bony lesions consistent with metastatic disease.  Small pleural effusion with adjacent atelectasis.  Left adrenal nodule 1.6 cm could represent metastasis.   CT of the head negative for metastasis   large right renal mass measuring 9.1 x 9.3 by 3 1.3 cm.  Extensive osseous metastatic disease greatest in the right sacrum and right posterior iliac bones.  Increased size of small bilateral pleural effusions.  Bilateral basilar subsegmental atelectasis.  Small amount of free intrapelvic fluid.  The patient is post hysterectomy.  Likely renal cell carcinoma.       Admit Date: 5/2/2024    Subjective:     The patient continues to participate in physical therapy and rehab.  She has not made any decisions about where she is going to go home now she is going to care for herself at discharge.  Preliminary pathology shows carcinoma.  Waiting for immunostains to determine the identification of where the cancer started.  
 conducted an initial consultation and spiritual assessment for Madina Schafer, who is a 69 y.o.,female    Patient received bad news today and is processing it. She is calm and very much believes in God  and is asking him to lead her life the way he wants it.    Initiated a relationship of care and support.   Explored issues of vishnu, belief, spirituality and Restorationism/ritual needs.  Listened empathically.  Provided information about Spiritual Care Services.   confirmed Patient's Adventism Affiliation.  Patient processed feelings about current hospitalization.  Offered prayer and assurance of continued prayers on patients behalf.   Chart reviewed.  Patient expressed gratitude for Spiritual Care visit.    Chaplains will continue to follow and will provide pastoral care as needed or requested.    recommends bedside caregivers page  on duty if patient shows signs of acute spiritual or emotional distress.    Sister Barbara Wallis MA, [facility]     Spiritual Care  [phone]   
-Discharge documentation reviewed with the patient.  -Patient states she is comfortable going home without oxygen.  -Requested  to review discharge plan with the patient.  -Walker provided to the patient prior to discharge.  -Questions encouraged and answered.  -Belongings with patient.  -Patient left unit via wheelchair with transporter and nurse.    
-Patient on room air: SpO2 93%  -Patient sitting edge of bed: SpO2 92%  -Patient standing at bedside: SpO2 93%  -Patient unable to walk. Patient c/o dizziness standing.    BP, HR sittin/73, 113 bpm  BP, HR standin/67, 125 bpm    -Notified Dr. Pugh. She is aware.  
-Per patient, she received a call from her pulmonologist today with notification of her rheumatoid factor lab resulting elevated. Also, her pulmonologist recommended she inform the medical staff here at the hospital.    -Dr. Schwartz was paged and notified. She informed me we will need to call the office.    Dr. Echo Mccallum  [id number] [facility] [place] [address]  [place], [place]  [phone]     -Awaiting call back from Pulmonologist office.      Update:  -Received call back from pulmonologist office. Was notified that her doctor will be notified tomorrow morning.    
0445:  Notified by PCT that patient has temperature of 100.3 and also her O2 sats are 88%.  Patient with multiple blankets and heat on.  Tylenol given per PRN orders and 2LNC placed on patient.  Removed some blankets and adjusted room temperature.  Recheck O2 sats 94%.  Patient denies feeling SOB and no distress was noted.    
0922 Spoke with CT tech stated cannot to Ct abdomen pelvis due to pt already had contrast. They have to wait 24 hours to do CT. Md soriano made aware.   
5/13/2024  PT treatment not completed due to:  [] Off Unit for testing/procedure  [] Pain  [] Eating  [] Patient too lethargic  [] Nausea/vomiting  [] Dialysis treatment in progress   [x] Other: Pt declines participation at this time, c/o being weak, nauseous and light headed off and on all day.  States nurse is aware and pt has received Zofran for nausea.  Will hold PT for now and f/u at later time as tolerated.  Note: discussion had re: possible medical transport home; pt does not feel she will need same.  Thank you.   MLITON Garsia, PT    2nd attempt: Nurse Janay in room and reports pt is leaving today.  Pt in semi-fowlers position, dressed in street clothing and states she talked with care mgr Destiny and since pt going home without oxygen, feels it will be okay to forgo therapy for step training.  No treatment given at this time.  Pt preparing for discharge.  MILTON Garsia, PT    
Bedside and Verbal shift change report given to DIMA Brizuela RN (oncoming nurse) by SHAYAN Varela RN (offgoing nurse). Report included the following information Nurse Handoff Report, Intake/Output, MAR, and Recent Results.     
Bedside shift report obtained from ASHLEI Maloney. Pt is npo for CT scan. Pt left unit via bed on telemery to CT scan. O2 via nasal cannula at 2L/min. IV heparin drip infusing via right forearm iv line. Second iv to left ac 20 gauge saline locked   
Cardiology Progress Note        Patient: Madina Schafer        Sex: female          DOA: 5/2/2024  YOB: 1955      Age:  69 y.o.        LOS:  LOS: 1 day   Assessment/Plan     Principal Problem:    Hypercalcemia  Active Problems:    Metastatic cancer to bone (HCC)    Adrenal nodule (HCC)    Pulmonary nodule    UTI (urinary tract infection)  Resolved Problems:    * No resolved hospital problems. *      Plan:  Weakness  Telemetry stable no cardiac complaints  Low-grade fever on Tylenol  Scheduled to have biopsy on Monday        CAD   Chronic CAD mid LAD post PCI in 2020   DVT  Hypertension   Bilateral small pleural effusion   Hypercalcemia   Pulmonary nodule  UTI  GERD  Possible metastatic cancer           Plan;  Clinically patient have no symptoms of angina ACS   No symptoms of CHF or fluid overload  Echocardiogram done with normal EF and wall  Etiology small pleural effusion is less likely cardiac in etiology  Continue treatment of hypercalcemia,   Continue with anticoagulation for DVT   Further testing for metastatic cancer.                Subjective:    cc:    Weakness      REVIEW OF SYSTEMS:     General: No fevers or chills.  Cardiovascular: No chest pain or pressure. No palpitations. No ankle swelling  Pulmonary: No SOB, orthopnea, PND  Gastrointestinal: No nausea, vomiting or diarrhea      Objective:      Visit Vitals  BP (!) 138/58   Pulse 88   Temp 98 °F (36.7 °C) (Temporal)   Resp 18   Ht 1.676 m (5' 6\")   Wt 85.7 kg (189 lb)   SpO2 98%   BMI 30.51 kg/m²     Body mass index is 30.51 kg/m².    Physical Exam:  General Appearance: Comfortable, not using accessory muscles of respiration.  NECK: No JVD, no thyroidomeglay.   LUNGS:  bilateral air entry positive   HEART: S1+S2 audible,    ABD: Non-tender, BS Audible    EXT: No edema, and no cysnosis.  VASCULAR EXAM: Pulses are intact.    PSYCHIATRIC EXAM: Mood is appropriate.    Medication:  Current Facility-Administered Medications   Medication 
Cardiology Progress Note        Patient: Madina Schafer        Sex: female          DOA: 5/2/2024  YOB: 1955      Age:  69 y.o.        LOS:  LOS: 2 days   Assessment/Plan     Principal Problem:    Hypercalcemia  Active Problems:    Metastatic cancer to bone (HCC)    Adrenal nodule (HCC)    Pulmonary nodule    UTI (urinary tract infection)  Resolved Problems:    * No resolved hospital problems. *      Plan:  CAD with striae of LAD PCI  Cardiac telemetry stable  No ischemic symptoms    Weakness  Telemetry stable no cardiac complaints  Low-grade fever on Tylenol  Scheduled to have biopsy on Monday        CAD   Chronic CAD mid LAD post PCI in 2020   DVT  Hypertension   Bilateral small pleural effusion   Hypercalcemia   Pulmonary nodule  UTI  GERD  Possible metastatic cancer           Plan;  Clinically patient have no symptoms of angina ACS   No symptoms of CHF or fluid overload  Echocardiogram done with normal EF and wall  Etiology small pleural effusion is less likely cardiac in etiology  Continue treatment of hypercalcemia,   Continue with anticoagulation for DVT   Further testing for metastatic cancer.                Subjective:    cc:    Weakness      REVIEW OF SYSTEMS:     General: No fevers or chills.  Cardiovascular: No chest pain or pressure. No palpitations. No ankle swelling  Pulmonary: No SOB, orthopnea, PND  Gastrointestinal: No nausea, vomiting or diarrhea      Objective:      Visit Vitals  /72   Pulse 93   Temp 99.7 °F (37.6 °C) (Temporal)   Resp 16   Ht 1.676 m (5' 6\")   Wt 90.5 kg (199 lb 8.3 oz)   SpO2 98%   BMI 32.20 kg/m²     Body mass index is 32.2 kg/m².    Physical Exam:  General Appearance: Comfortable, not using accessory muscles of respiration.  NECK: No JVD, no thyroidomeglay.   LUNGS:  bilateral air entry positive   HEART: S1+S2 audible,    ABD: Non-tender, BS Audible    EXT: No edema, and no cysnosis.  VASCULAR EXAM: Pulses are intact.    PSYCHIATRIC EXAM: Mood is 
Cardiology Progress Note        Patient: Madina Schafer        Sex: female          DOA: 5/2/2024  YOB: 1955      Age:  69 y.o.        LOS:  LOS: 3 days   Assessment/Plan     Principal Problem:    Hypercalcemia  Active Problems:    Metastatic cancer to bone (HCC)    Adrenal nodule (HCC)    Pulmonary nodule    UTI (urinary tract infection)    Renal mass    Deep vein thrombosis (DVT) of both lower extremities (HCC)  Resolved Problems:    * No resolved hospital problems. *      Plan:  Status post CT-guided biopsy sacral lesion  No symptoms of chest pain or shortness of breath  CAD with striae of LAD PCI  Cardiac telemetry stable  No ischemic symptoms    Weakness  Telemetry stable no cardiac complaints  Low-grade fever on Tylenol  Scheduled to have biopsy on Monday        CAD   Chronic CAD mid LAD post PCI in 2020   DVT  Hypertension   Bilateral small pleural effusion   Hypercalcemia   Pulmonary nodule  UTI  GERD  Possible metastatic cancer           Plan;  Clinically patient have no symptoms of angina ACS   No symptoms of CHF or fluid overload  Echocardiogram done with normal EF and wall  Etiology small pleural effusion is less likely cardiac in etiology  Continue treatment of hypercalcemia,   Continue with anticoagulation for DVT   Further testing for metastatic cancer.                Subjective:    cc:    Weakness      REVIEW OF SYSTEMS:     General: No fevers or chills.  Cardiovascular: No chest pain or pressure. No palpitations. No ankle swelling  Pulmonary: No SOB, orthopnea, PND  Gastrointestinal: No nausea, vomiting or diarrhea      Objective:      Visit Vitals  BP (!) 141/73   Pulse 97   Temp 98 °F (36.7 °C) (Oral)   Resp 18   Ht 1.676 m (5' 6\")   Wt 90.5 kg (199 lb 8.3 oz)   SpO2 98%   BMI 32.20 kg/m²     Body mass index is 32.2 kg/m².    Physical Exam:  General Appearance: Comfortable, not using accessory muscles of respiration.  NECK: No JVD, no thyroidomeglay.   LUNGS:  bilateral air entry 
Cardiology Progress Note        Patient: Madina Schafer        Sex: female          DOA: 5/2/2024  YOB: 1955      Age:  69 y.o.        LOS:  LOS: 5 days   Assessment/Plan     Principal Problem:    Hypercalcemia  Active Problems:    Metastatic cancer to bone (HCC)    Adrenal nodule (HCC)    Pulmonary nodule    UTI (urinary tract infection)    Renal mass    Deep vein thrombosis (DVT) of both lower extremities (HCC)    Pleural effusion  Resolved Problems:    * No resolved hospital problems. *      Plan:  Cardiac telemetry sinus rhythm  Continue with Lasix  No ischemic symptoms  Biopsy/pathology results pending      Status post CT-guided biopsy sacral lesion  No symptoms of chest pain or shortness of breath  CAD with striae of LAD PCI  Cardiac telemetry stable  No ischemic symptoms    Weakness  Telemetry stable no cardiac complaints  Low-grade fever on Tylenol  Scheduled to have biopsy on Monday        CAD   Chronic CAD mid LAD post PCI in 2020   DVT  Hypertension   Bilateral small pleural effusion   Hypercalcemia   Pulmonary nodule  UTI  GERD  Possible metastatic cancer           Plan;  Clinically patient have no symptoms of angina ACS   No symptoms of CHF or fluid overload  Echocardiogram done with normal EF and wall  Etiology small pleural effusion is less likely cardiac in etiology  Continue treatment of hypercalcemia,   Continue with anticoagulation for DVT   Further testing for metastatic cancer.                Subjective:    cc:    Weakness      REVIEW OF SYSTEMS:     General: No fevers or chills.  Cardiovascular: No chest pain or pressure. No palpitations. No ankle swelling  Pulmonary: No SOB, orthopnea, PND  Gastrointestinal: No nausea, vomiting or diarrhea      Objective:      Visit Vitals  /66   Pulse 95   Temp 97.9 °F (36.6 °C) (Oral)   Resp 18   Ht 1.676 m (5' 6\")   Wt 90.5 kg (199 lb 8.3 oz)   SpO2 100%   BMI 32.20 kg/m²     Body mass index is 32.2 kg/m².    Physical Exam:  General 
Cardiology Progress Note        Patient: Madina Schafer        Sex: female          DOA: 5/2/2024  YOB: 1955      Age:  69 y.o.        LOS:  LOS: 7 days   Assessment/Plan     Principal Problem:    Hypercalcemia  Active Problems:    Metastatic cancer to bone (HCC)    Adrenal nodule (HCC)    Pulmonary nodule    UTI (urinary tract infection)    Renal mass    Deep vein thrombosis (DVT) of both lower extremities (HCC)    Pleural effusion    Renal cell carcinoma (HCC)  Resolved Problems:    * No resolved hospital problems. *      Plan:  Cardiac telemetry sinus rhythm  Continue with Lasix  No ischemic symptoms  Biopsy/pathology-   renal cancer with metastasis, treatment plan as outlined by Oncology   Cardiology will follow as needed basis.    Please call on call cardiologist if you have any questions or concerns  Discussed with patient.    Status post CT-guided biopsy sacral lesion  No symptoms of chest pain or shortness of breath  CAD with striae of LAD PCI  Cardiac telemetry stable  No ischemic symptoms    Weakness  Telemetry stable no cardiac complaints  Low-grade fever on Tylenol  Scheduled to have biopsy on Monday        CAD   Chronic CAD mid LAD post PCI in 2020   DVT  Hypertension   Bilateral small pleural effusion   Hypercalcemia   Pulmonary nodule  UTI  GERD  Possible metastatic cancer           Plan;  Clinically patient have no symptoms of angina ACS   No symptoms of CHF or fluid overload  Echocardiogram done with normal EF and wall  Etiology small pleural effusion is less likely cardiac in etiology  Continue treatment of hypercalcemia,   Continue with anticoagulation for DVT   Further testing for metastatic cancer.                Subjective:    cc:    Weakness      REVIEW OF SYSTEMS:     General: No fevers or chills.  Cardiovascular: No chest pain or pressure. No palpitations. No ankle swelling  Pulmonary: No SOB, orthopnea, PND  Gastrointestinal: No nausea, vomiting or diarrhea      Objective: 
Face to face report received from nurseIsaac RN. Report given per SBAR and all questions asked/answered at this time. Care assumed for this patient at this time.  
JANES called and LM for Irene, friend, to call back regarding update. Patient agreeable to have Irene aware of medical and DC plan.     Point of contact:    Opal SMITHSP, CRISC, CDPSE, GCPM, EDRP, CHFI, LSSWB, MS Edel  Director of Customer Success Management (CSM)   m. [phone]   [email or web address]       
No path yet.  The patient did have a slightly elevated RF at  [employer] at 16.2.  Sed rate 50.  Await path results.  No pain from biopsy.  No decisions about discharge plans yet.  Will follow peripherally - need path   
Occupational Therapy Goals:  Initiated 5/10/2024 to be met within 7-10 days.  Short Term Goals  Time Frame for Short Term Goals: 7 days  Short Term Goal 1: Patient will complete grooming EOB with setup A  Short Term Goal 2: Patient will complete bed to chair/bsc transfer with min A  Short Term Goal 3: Patient will complete LBD with min A and adaptive equipment as needed  Short Term Goal 4: Patient will complete bathing with min A  Short Term Goal 5: Patient will complete toileting with min A      OCCUPATIONAL THERAPY TREATMENT    Patient: Madina Schafer (69 y.o. female)  Date: 5/10/2024  Diagnosis: Hypercalcemia [E83.52]  Metastatic cancer to bone (HCC) [C79.51]  Adrenal nodule (HCC) [E27.8]  Generalized weakness [R53.1]  Stented coronary artery [Z95.5]  Unable to walk [R26.2]  Multiple lung nodules [R91.8]  Anemia, unspecified type [D64.9] Hypercalcemia      Precautions:  Fall Risk,     Chart, occupational therapy assessment, plan of care, and goals were reviewed.  ASSESSMENT: RN cleared pt for therapy tx session. Upon arrival, pt had [employer] friends visiting and [facility] representative present. Pt reporting no pain at this time. Pt was tele attached, bilateral gripper socks donned, and 2L of NC donned. Pt agreeable to therapy session. [facility] representative states this is perfect timing because she needed to see how patient was functionally doing. Pt declines dressing/bathing tasks stating that she did this earlier. Pt agreeable to out of bed mobility. Pt demonstrates ability to complete supine to sit with CGA. Once seated at EOB, pt requests washing of back. Pt's vitals supine and sitting WNL. Pt reporting pain in R hip with movement.  Pt presents diaphoretic and is provided cold wash cloth to wash face (noted that pt's back was warm prior to movement and warm temp in room). Pt denies nausea and states low level of dizziness. Pt demonstrates ability to complete sit to stand transfer with CGA and use of 2WW. Pt 
Occupational Therapy Goals:  Initiated 5/6/2024 to be met within 7-10 days.  Short Term Goals  Time Frame for Short Term Goals: 7 days  Short Term Goal 1: Patient will complete grooming EOB with setup A  Short Term Goal 2: Patient will complete bed to chair/bsc transfer with min A  Short Term Goal 3: Patient will complete LBD with min A and adaptive equipment as needed  Short Term Goal 4: Patient will complete bathing with min A  Short Term Goal 5: Patient will complete toileting with min A    OCCUPATIONAL THERAPY EVALUATION    Patient: Madina Schafer (69 y.o. female)  Date: 5/6/2024  Primary Diagnosis: Hypercalcemia [E83.52]  Metastatic cancer to bone (HCC) [C79.51]  Adrenal nodule (HCC) [E27.8]  Generalized weakness [R53.1]  Stented coronary artery [Z95.5]  Unable to walk [R26.2]  Multiple lung nodules [R91.8]  Anemia, unspecified type [D64.9]       Precautions: Fall Risk,  ,  ,  ,  ,  ,  ,    PLOF: Patient completed ADLs independently    ASSESSMENT :  Patient presented supine in bed with Purwick. Patient with visitor present for entire session. Patient seen with physical therapy for second set of skilled hands. Patient completed assessment of ADLs and BUE strength, ROM (see details below). Patient reports has been unable to sit EOB during admission d/t weakness. BUE strength functional to assist patient in using bedrails to come to sit, however will need to determine if core weakness is contributing factor. Patient not wanting to try to sit EOB at this session, wants to wait till after the biopsy. Patient NPO so oral hygiene deferred, patient has supplies for hair grooming, reports RN washed her hair earlier and combed it after that.  Due to deficits (listed below) patient has decreased independence with ADLs, IADLs, and functional mobility and would benefit from SNF  ,      DEFICITS/IMPAIRMENTS:  Performance deficits / Impairments: Decreased functional mobility ;Decreased high-level IADLs;Decreased ADL 
Occupational Therapy Goals:  Initiated 5/8/2024 to be met within 7-10 days.  Short Term Goals  Time Frame for Short Term Goals: 7 days  Short Term Goal 1: Patient will complete grooming EOB with setup A  Short Term Goal 2: Patient will complete bed to chair/bsc transfer with min A  Short Term Goal 3: Patient will complete LBD with min A and adaptive equipment as needed  Short Term Goal 4: Patient will complete bathing with min A  Short Term Goal 5: Patient will complete toileting with min A      OCCUPATIONAL THERAPY TREATMENT    Patient: Madina Schafer (69 y.o. female)  Date: 5/8/2024  Diagnosis: Hypercalcemia [E83.52]  Metastatic cancer to bone (HCC) [C79.51]  Adrenal nodule (HCC) [E27.8]  Generalized weakness [R53.1]  Stented coronary artery [Z95.5]  Unable to walk [R26.2]  Multiple lung nodules [R91.8]  Anemia, unspecified type [D64.9] Hypercalcemia      Precautions: Fall Risk,  ,  ,  ,  ,  ,  ,    PLOF: See evaluation    Chart, occupational therapy assessment, plan of care, and goals were reviewed.  ASSESSMENT:  Patient presented supine in bed with Purwick, brief, and cardiac monitor. Patient with no visitors present for entire session.  Patient brief and bed saturated.  Patient completed upper body bathing at bedlevel with setup assist, upper body dressing minimum assist, lower body dressing maximum assist for brief change, and toileting hygiene min A. Patient rolling for bed linen change and brief change. Patient initially needing min/mod A to roll, but by final roll was completing with CGA. Patient was mod A to come to sit at EOB. Patient provided AE for LBD. Patient educated on each item. With AE patient was min A for gripper sock don/doff. Patient was min A to use shampoo cap to clean hair and min A to comb hair with assist for knot at back of head that was difficult for her to reach. Patient IV cover coming loose, ASHLIE Jyoti informed. Purwick and clean brief in place at end of session, patient reports 
PHYSICAL THERAPY TREATMENT    Patient: Madina Schafer (69 y.o. female)  Date: 5/12/2024  Diagnosis: Hypercalcemia [E83.52]  Metastatic cancer to bone (HCC) [C79.51]  Adrenal nodule (HCC) [E27.8]  Generalized weakness [R53.1]  Stented coronary artery [Z95.5]  Unable to walk [R26.2]  Multiple lung nodules [R91.8]  Anemia, unspecified type [D64.9] Hypercalcemia      Precautions: Fall Risk,  ,  ,  ,  ,  ,  ,      ASSESSMENT:  Pt in semi fowlers in bed upon arrival.  Pt reports breathing is okay today as well as pain R hip 1-5/10 using numerical pain scale.  AMISHA performed in bed and then pt required min/mod A for transfering supine<>sit w/ additional time.  Sit<>stand min A additional time.  Pt reported feeling need to use BSC for possible BM but was unsuccessful.  As pt sat on BSC noted sweating and pt then c/o feeling light headed.  Assisted pt back to bed, gait w/ RW, GB and min A where s/s resolved.  Pt left in semi fowlers position in bed w/ all needs within reach.  All activity seems to be challenging for pt.  Recommend SNF rehab upon hospital d/c.    Progression toward goals:   []      Improving appropriately and progressing toward goals  [x]      Improving slowly and progressing toward goals  []      Not making progress toward goals and plan of care will be adjusted     PLAN:  Patient continues to benefit from skilled intervention to address the above impairments.  Continue treatment per established plan of care.    Further Equipment Recommendations for Discharge: TBD    [facility]:   AM-PAC Inpatient Mobility without Stair Climbing Raw Score : 10    Current research shows that an AM-PAC score of 17 (13 without stairs) or less is not associated with a discharge to the patient's home setting. Based on an AM-PAC score and their current functional mobility deficits, it is recommended that the patient have 5-7 sessions per week of Physical Therapy at d/c to increase the patient's independence.  Currently, this patient 
Patient out of room for procedure. SW to attempt assessment at a later time. SW to follow.   
Patient returned from procedure. A+O x4. On O2 via nasal cannula. Denies any complaints at this time. ADL provided. Purewick changed. Per report, heparin drip to be restarted at 1600. Dressing to sacrum clean dry and intact. Meal tray ordered for patient. Multiple friends at bedside   
Patient with elevated temperature of 102. MD Villalpando made aware. Tylenol given per Prn order   
Per patient she has a bedside commode at home and a walker provided per patient request. Patient has been on room air and stated that \"she will not be ambulating much at home and doesn't need 02\". RN at bedside and aware of conversation as well. When admitted to SNF and participating with PT- patient does have access to 02. [facility] to be at home to assist patient inside home. Caregiver will be with patient 24/hr a day upon DC. Patient in agreement with DC plan and agreeable with DC. Patient wanting to DC home with friend and does not want medical transport at this time. Patient feels comfortable with plan in place. Patient to admit to [facility] Wednesday 05/15.   
Physical Therapy      1315: Pt reports nausea, given Zofran about 30 min ago, will follow up as schedule permits.    
Physical Therapy    1118: OT working with pt, will follow up for PT.    1322: Pt about to get cleaned up by nurse, will follow up again shortly.    
Physical Therapy    1150: Pt refusing PT at this time, visitor present, reviewed LE exercises to continue to perform independently in bed.  Will follow up as schedule permits.    
Physical Therapy    1255: Pt currently with 3 visitors, will follow up again for PT.    
Physical Therapy    1318: Pt has 2 visitors in room, currently fixing pt hair, will follow up again shortly.    
Physical Therapy Goals:  Initiated 5/6/2024 to be met within 7-10 days.  Short Term Goals  Short Term Goal 1: Patient will perform supine to/from sit with supervision.  Short Term Goal 2: Patient will perform sit to/from stand with S/RW in prep for ambulation activity.  Short Term Goal 3: Patient will perform ambulation 50 ft with S/RW for increased functional mobility.  Short Term Goal 4: Patient will perform step nego x3-5 with min/CGA and HR for home re-entry.      PHYSICAL THERAPY EVALUATION    Patient: Madina Schafer (69 y.o. female)  Date: 5/6/2024  Primary Diagnosis: Hypercalcemia [E83.52]  Metastatic cancer to bone (HCC) [C79.51]  Adrenal nodule (HCC) [E27.8]  Generalized weakness [R53.1]  Stented coronary artery [Z95.5]  Unable to walk [R26.2]  Multiple lung nodules [R91.8]  Anemia, unspecified type [D64.9]  Precautions: Fall Risk  PLOF: amb with rollator and lives in [facility]    ASSESSMENT :  Based on the objective data described below, the patient presents with decreased ROM/motor performance BLE's, limitations in functional mobility with regard to bed mobility and transfers, gait quality and decreased activity tolerance following admission for above diagnosis.  Patient seen on telemetry unit, found supine in bed, reporting 2-3/10 pain R SI at rest, which increases to 8/10 during ROM/MMT and decreased to 4/10 at end of session. Pt seen with OT for second set of skilled hands.  Pt cooperative for supine testing as noted below, however, expresses anxiety and concern over upcoming biopsy, therefore declined sitting EOB or standing.  Words of encouragement offered.  Pt left reclined in bed with all needs in reach, lines intact and nurse notified.   Pt may benefit from IPPT to address deficits and achieve goals above. Recommend SNF at this point for follow up physical therapy upon discharge to reach maximal level of independence and safety with functional mobility.       DEFICITS/IMPAIRMENTS: 
Plan is to DC home today, pending 02 need. If 02 is needed, JANES to arrange. Tremayne to assist patient at home today, start of care at 3pm. Patient to follow up with oncology office 05/14 and then admit to [facility] 05/15 with oral meds being brought to SNF. Patient in agreement with plan at this time. SW communicated plan with friend, fiorella, per patient request. Awaiting walk test.   
Pt has been NPO for procedure. Received call from radiology RN to place Heparin drip on hold for procedure. Patient made aware. Heparin drip stopped. MD Schwartz also aware. Will maintain NPO status for procedure.  
Pt seen and examed. Dvt noted from pvl -heparin gtt, ct biopsy will be done on Monday , thoracentesis hold -not enough fluid -discussed with IR/RN/CM/PT   
SW spoke with patient at bedside regarding DC plan. Patient stated that she mentally will not be able to make a decision regarding DC plan until biopsy results have resulted. SW attempted to explain, regardless of results, PT/OT eval on 05/06 that was completed, did not indicate that the patient would be able to DC home upon DC. Patient agreeable. Patient also stated that she had many friends from [employer] \"begging\" to help, however, patient did not feel comfortable with their assist. SW took \"friend option for assist\" off table. Patient agreeable. SW explained rehab vs private duty upon dc, however awaiting biopsy results for patient determination. Referrals placed for possible rehab options in the meantime. PT/OT to continue working with patient in acute setting. SW to follow.   
SW spoke with patient at bedside regarding possible options. Patient has been told tx typically is not covered in a rehab facility and that she does not have support at home upon DC. SW reached out to the [facility] ([facility]) regarding \"carve out policy\" and [employer]. Facility is not able to provide meds needed. SW reached out to [facility] as well, facility not able to provide med as well. According to MD, another option would be, DC home, following day, follow up in oncology office for med and then admit to rehab facility. If patient qualifies for rehab, IPR or SNF, this would be an option, however patient would need to feel physically comfortable to DC home, get to office, and then rehab. Patient sated that she does not have anyone that is able to assist upon DC with the level of care that she would need at home due to extreme weakness. Awaiting PT/OT eval to determine appropriate plan of action upon DC. SW To follow.   
SW spoke with patient regarding DC follow up. Patient agreeable with private duty care at home (24 hour care). [facility] private duty to assess patient at bedside 05/10 to arrange care at home. Patient interested in [facility] after IV  has been completed at oncology office. SW explained that patient would been to be able to tolerate that specific level care. Referral placed to [facility] which will continue to follow but as of now patient seems to be needing a lower level of care, more SNF appropriate. Awaiting med choice, as this could be a possible barrier for SNF placement. Patient may be more suitable at home with [facility] and private duty care upon DC. SW to explore all options with patient. SW to follow.   
SW spoke with patient regarding DC planning needs. Tremayne came to assess patient at bedside and able to speak with patient regarding plan of care. Patient agreeable to DC home Monday (with [facility] starting at 3pm) with 24 hour care, follow up Tuesday in oncology office for infusion, and then admit to SNF Wednesday; can not be same day as infusion. [facility] facilities are able to accept patient, if patient brings in her own oral meds, for rehab, day after infusion. Tremayne to assist patient at home until patient is able to admit to rehab. SW to reach out to [facility] Monday to see what facility will have available beds.  [facility]  [facility]  SW explained that no [facility] allow patient to provide own meds. Patient aware and agreeable with plan. Meds TBD by MD and patient due to cost, however facility able to accept if patient can provide own meds vs needing to be dispensed by their pharmacy. SW to follow to assist with DC planning needs.   
Spoke to pt primary RN. Heparin held at 0800 for biopsy. Location of biopsy to be determined after radiologist review. Keep pt NPO.   
Spoke with Dr. Schwartz regarding pt nausea; order received for IV promethazine 12.5 mg.  
This RN went into Patient room to give morning medications and noticed that Patient was blowing her nose and it was bleeding. Patient stated that this has been going on since yesterday with the oxygen. This RN humidified oxygen and Patient no longer had a nose bleed. Patient is now resting in bed and there are no other concerns at this time.   
  CBC w/Diff Recent Labs     05/03/24  0010 05/03/24  0430 05/04/24  0245   WBC 6.6 6.4 6.3   RBC 3.52* 3.45* 3.39*   HGB 9.2* 9.1* 8.8*   HCT 29.3* 28.9* 28.1*    310 317      Cardiac Enzymes No results for input(s): \"CPK\" in the last 72 hours.    Invalid input(s): \"CKRMB\", \"CKND1\", \"TROIP\", \"CHARLES\"   Coagulation Recent Labs     05/03/24  0010 05/03/24  1021 05/04/24  0245 05/04/24  0832   INR 1.2*  --   --   --    APTT  --    < > 73.2* 64.2*    < > = values in this interval not displayed.       Lipid Panel Lab Results   Component Value Date/Time    CHOL 201 07/23/2020 07:55 AM    HDL 64 07/23/2020 07:55 AM    .4 07/23/2020 07:55 AM    VLDL 20.6 07/23/2020 07:55 AM      BNP Invalid input(s): \"BNPP\"   Liver Enzymes No results for input(s): \"TP\" in the last 72 hours.    Invalid input(s): \"ALB\", \"TBIL\", \"AP\", \"SGOT\", \"GPT\", \"DBIL\"   Thyroid Studies No results found for: \"T4\", \"T3RU\", \"TSH\"     Procedures/imaging: see electronic medical records for all procedures/Xrays and details which were not copied into this note but were reviewed prior to creation of Plan      
S2 neural foramina.. Lytic lesion also shown in the right posterior iliac bone. Sacral lesion measures up to 4.7 cm transverse and 6 cm craniocaudal. Right iliac lesion measures up to 3.5 cm transverse. ABDOMINAL WALL: No mass or hernia. ADDITIONAL COMMENTS: N/A     1. Large right renal mass measuring 9.1 x 9.3 x 7.3 cm in size. 2. Extensive osseous metastatic disease appearing greatest in the right sacrum and right posterior iliac bone. 3. Increased size of small bilateral pleural effusions. Bilateral basilar subsegmental atelectasis. 4. Small amount of free intrapelvic fluid. Patient status post hysterectomy.    Echo (TTE) complete (PRN contrast/bubble/strain/3D)    Result Date: 5/3/2024    Left Ventricle: Normal left ventricular systolic function with a visually estimated EF of 55%. EF by 2D Simpsons Biplane is 52%. Left ventricle size is normal. Mildly increased wall thickness. Normal wall motion. Normal diastolic function. Average E/e' ratio is 4.03.   Right Ventricle: Normal systolic function. TAPSE is 2.0 cm. RV Peak S' is 21 cm/s.   Aortic Valve: Trileaflet valve. Mildly thickened cusp. No regurgitation.   Mitral Valve: Trace regurgitation.   Tricuspid Valve: Trace to mild regurgitation. The estimated RVS/PAS pressure is 36 mmHg.   Pericardium: There is evidence of epicardial fat. No pericardial effusion.   IVC/SVC: IVC diameter is less than or equal to 21 mm and decreases greater than 50% during inspiration; therefore the estimated right atrial pressure is normal (~3 mmHg).     XR HIP BILATERAL W AP PELVIS (2 VIEWS)    Result Date: 5/3/2024  EXAM:  XR HIP BILATERAL W AP PELVIS (2 VIEWS) INDICATION:   fall hip pain COMPARISON: None. FINDINGS: An AP view of the pelvis and a frogleg lateral views of both hips demonstrate no fracture, dislocation or other abnormality. There is retained contrast material in the urinary bladder.     Normal hips.     US CHEST INCLUDING MEDIASTINUM    Result Date: 
ventricle size is normal. Mildly increased wall thickness. Normal wall motion. Normal diastolic function. Average E/e' ratio is 4.03.    Right Ventricle: Normal systolic function. TAPSE is 2.0 cm. RV Peak S' is 21 cm/s.    Aortic Valve: Trileaflet valve. Mildly thickened cusp. No regurgitation.    Mitral Valve: Trace regurgitation.    Tricuspid Valve: Trace to mild regurgitation. The estimated RVS/PAS pressure is 36 mmHg.    Pericardium: There is evidence of epicardial fat. No pericardial effusion.    IVC/SVC: IVC diameter is less than or equal to 21 mm and decreases greater than 50% during inspiration; therefore the estimated right atrial pressure is normal (~3 mmHg).    Signed by: Sergio Mock MD on 5/3/2024  4:34 PM    No results found for this or any previous visit.                Signed By: SERGIO MOCK MD     May 9, 2024      
wall motion. Normal diastolic function. Average E/e' ratio is 4.03.    Right Ventricle: Normal systolic function. TAPSE is 2.0 cm. RV Peak S' is 21 cm/s.    Aortic Valve: Trileaflet valve. Mildly thickened cusp. No regurgitation.    Mitral Valve: Trace regurgitation.    Tricuspid Valve: Trace to mild regurgitation. The estimated RVS/PAS pressure is 36 mmHg.    Pericardium: There is evidence of epicardial fat. No pericardial effusion.    IVC/SVC: IVC diameter is less than or equal to 21 mm and decreases greater than 50% during inspiration; therefore the estimated right atrial pressure is normal (~3 mmHg).    Signed by: Sergio Mock MD on 5/3/2024  4:34 PM    No results found for this or any previous visit.                Signed By: SERGIO MOCK MD     May 7, 2024      
IntraVENous PRN    magnesium sulfate 2000 mg in 50 mL IVPB premix  2,000 mg IntraVENous PRN    polyethylene glycol (GLYCOLAX) packet 17 g  17 g Oral Daily PRN    acetaminophen (TYLENOL) tablet 650 mg  650 mg Oral Q6H PRN    Or    acetaminophen (TYLENOL) suppository 650 mg  650 mg Rectal Q6H PRN    sodium chloride flush 0.9 % injection 5-40 mL  5-40 mL IntraVENous 2 times per day    sodium chloride flush 0.9 % injection 5-40 mL  5-40 mL IntraVENous PRN    0.9 % sodium chloride infusion   IntraVENous PRN    pantoprazole (PROTONIX) tablet 40 mg  40 mg Oral Daily    rosuvastatin (CRESTOR) tablet 10 mg  10 mg Oral Nightly    thyroid (ARMOUR) tablet 60 mg  60 mg Oral Daily    oxyCODONE-acetaminophen (PERCOCET) 5-325 MG per tablet 1 tablet  1 tablet Oral Q6H PRN    lidocaine PF 1 % injection 10 mL  10 mL IntraDERmal Once    Polyvinyl Alcohol-Povidone PF (REFRESH) 1.4-0.6 % ophthalmic solution 2 drop  2 drop Both Eyes Q4H PRN       Recent Results (from the past 24 hour(s))   Comprehensive Metabolic Panel    Collection Time: 05/11/24  2:50 AM   Result Value Ref Range    Sodium 138 136 - 145 mmol/L    Potassium 3.9 3.5 - 5.5 mmol/L    Chloride 101 100 - 111 mmol/L    CO2 30 21 - 32 mmol/L    Anion Gap 7 3.0 - 18 mmol/L    Glucose 97 74 - 99 mg/dL    BUN 10 7.0 - 18 MG/DL    Creatinine 0.83 0.6 - 1.3 MG/DL    Bun/Cre Ratio 12 12 - 20      Est, Glom Filt Rate 76 >60 ml/min/1.73m2    Calcium 8.6 8.5 - 10.1 MG/DL    Total Bilirubin 0.3 0.2 - 1.0 MG/DL    ALT 22 13 - 56 U/L    AST 30 10 - 38 U/L    Alk Phosphatase 127 (H) 45 - 117 U/L    Total Protein 5.8 (L) 6.4 - 8.2 g/dL    Albumin 1.9 (L) 3.4 - 5.0 g/dL    Globulin 3.9 2.0 - 4.0 g/dL    Albumin/Globulin Ratio 0.5 (L) 0.8 - 1.7             Procedures/imaging: see electronic medical records for all procedures/Xrays and details which were not copied into this note but were reviewed prior to creation of Plan    AN PACHECO MD   5/11/2024, 9:17 AM    
trace right pleural effusions, not enough for thoracentesis.    Vascular duplex lower extremity venous bilateral    Result Date: 5/3/2024    Age indeterminate non-occlusive deep vein thrombosis in the left common femoral vein.   Age indeterminate non-occlusive superficial vein thrombosis in the left saphenofemoral junction.   No evidence of deep vein thrombosis in the right lower extremity.     CT HEAD WO CONTRAST    Result Date: 5/3/2024  EXAM:  CT HEAD WO CONTRAST INDICATION: Weakness. COMPARISON: None TECHNIQUE: Noncontrast head CT. Coronal and sagittal reformats. CT dose reduction was achieved through use of a standardized protocol tailored for this examination and automatic exposure control for dose modulation. FINDINGS: The ventricles and sulci are age-appropriate without hydrocephalus. There is no mass effect or midline shift. There is no intracranial hemorrhage or extra-axial fluid collection. There is no abnormal parenchymal attenuation. The gray-white matter differentiation is maintained. The basal cisterns are patent. The osseous structures are intact. The visualized paranasal sinuses and mastoid air cells are clear.     No acute intracranial abnormality.     CTA CHEST W WO CONTRAST    Result Date: 5/3/2024  EXAM:  CTA CHEST W WO CONTRAST INDICATION: Shortness of breath, weakness, elevated D-dimer COMPARISON: Radiograph 5/3/2024 TECHNIQUE: Helical thin section chest CT following uneventful intravenous administration of nonionic contrast according to departmental PE protocol. Coronal and sagittal reformats were performed. 3D/MIP post processing was performed. CT dose reduction was achieved through use of a standardized protocol tailored for this examination and automatic exposure control for dose modulation. FINDINGS: This is a good quality study for the evaluation of pulmonary embolism to the first subsegmental arterial level. There is no pulmonary embolism to this level. The visualized thyroid gland is 
as she has coverage.    Await pathology report.  Final recommendations once we have a tissue diagnosis.        Cristóbal Mason MD MD  [facility]  Office [phone]

## 2024-05-13 NOTE — DISCHARGE SUMMARY
[address]  [address]                            DISCHARGE SUMMARY      PATIENT NAME: DESIRAE NORIEGA             : 1955  MED REC NO: [id number]                       ROOM: 359  ACCOUNT NO: [id number]                       ADMIT DATE: 2024  PROVIDER: Nayely Pugh MD    DISCHARGE DATE:  2024    ATTENDING PHYSICIAN:  TAMIKO FRASER    DIAGNOSES AT DISCHARGE:    1. Metastatic kidney cancer.  2. Hypercalcemia.  3. Urinary tract infection.  4. Pleural effusion.  5. Deep venous thrombosis.    The patient is a full code.  The patient is on a regular diet as tolerated.  She is weaning off oxygen.    CONSULTANTS DURING THIS STAY:  Hematology-Oncology, Cardiology, Physical Therapy, Interventional Radiology.    HOSPITAL SUMMARY:  This is a 69-year-old female with a history of coronary artery disease, status post stenting, who came in with a diagnosis of UTI after 2 weeks of waxing and waning generalized weakness with decreased appetite and falls.  She had recently been diagnosed with a pleural effusion, seen by Pulmonology, but she was too weak to get her medications still a bit as an outpatient.  She had dyspnea with minimal exertion.  She lost about 8 pounds in 3 weeks.  She was found to be hypercalcemic with a lung effusion and bony lytic mets on the CT scan when she came into the emergency room.  She is a .  She has [employer] friends for support.  She was admitted for hypercalcemia, new metastatic lesions, and pleural effusion.  The patient was followed by Hematology and Cardiology.  She has had multiple testing to evaluate the hypercalcemia at this point because of the high suspicion for malignancy.  She has had a CT-guided biopsy of the right sacral lesion.  The patient's respiratory status is stable.  She has been on Lasix.  She has diuresed effectively.  There are no acute coronary symptoms here.  She is in

## 2025-03-05 NOTE — DISCHARGE SUMMARY
Discharge Summary    Patient: Brigette Roblero MRN: [id number]  CSN: [id number]    YOB: 1955  Age: 72 y.o. Sex: female    DOA: 7/23/2020 LOS:  LOS: 0 days   Discharge Date:      Primary Care Provider:  Fraser Gowers, DO    Admission Diagnoses: Abnormal stress test [R94.39]  Stented coronary artery [Z95.5]    Discharge Diagnoses:    Hospital Problems  Date Reviewed: 5/16/2014          Codes Class Noted POA    Stented coronary artery ICD-10-CM: Z95.5  ICD-9-CM: V45.82  7/23/2020 Unknown              Discharge Condition: Good    Discharge Medications:     Current Discharge Medication List      START taking these medications    Details   ticagrelor (BRILINTA) 90 mg tablet Take 1 Tab by mouth every twelve (12) hours every twelve (12) hours. Qty: 180 Tab, Refills: 3         CONTINUE these medications which have NOT CHANGED    Details   pantoprazole (Protonix) 40 mg tablet Take 40 mg by mouth daily. lisinopriL (PRINIVIL, ZESTRIL) 10 mg tablet Take  by mouth daily. magnesium oxide (MAG-OX) 400 mg tablet Take 400 mg by mouth nightly. conjugated estrogens (Premarin) 0.3 mg tablet Take 0.3 mg by mouth daily. conjugated estrogens (Premarin) 0.625 mg/gram vaginal cream Insert 0.5 g into vagina three (3) times daily as needed. levothyroxine (synthroid) 25 mcg tablet Take 25 mcg by mouth Daily (before breakfast). CALCIUM CARBONATE/VITAMIN D3 (CALCIUM 600 + D,3, PO) Take 1 Tab by mouth daily. DOCOSAHEXANOIC ACID/EPA (FISH OIL PO) Take 1 Cap by mouth daily. lactobacillus acidoph-pectin (ACIDOPHILUS) cap Take 1 Cap by mouth daily. simvastatin (ZOCOR) 40 mg tablet Take 40 mg by mouth nightly. cycloSPORINE (RESTASIS) 0.05 % ophthalmic emulsion Administer 1 Drop to both eyes two (2) times a day. multivitamin (ONE A DAY) tablet Take 1 Tab by mouth daily. cyclobenzaprine (FLEXERIL) 5 mg tablet Take 5 mg by mouth daily as needed for Muscle Spasm(s). traZODone (DESYREL) 50 mg tablet Take  by mouth nightly. Aspirin, Buffered 81 mg tab Take  by mouth daily. STOP taking these medications       celecoxib (CELEBREX) 100 mg capsule Comments:   Reason for Stopping:         finasteride (PROSCAR) 5 mg tablet Comments:   Reason for Stopping:         nitrofurantoin, macrocrystal-monohydrate, (MACROBID) 100 mg capsule Comments:   Reason for Stopping:                 Please note that beta-blocker was not prescribed due to mild resting bradycardia. Procedures :    1-   Single-vessel occlusive coronary artery disease.     2-    Mid LAD 90% stenotic lesion treated with single drug-eluting Xience Nidhi stent 2.5 into 28 mm with excellent results.     3-    Distal LAD chronic total occlusion was treated with single drug-eluting Xience Nidhi stent 2.5 x 38 mm with excellent results.       Please note that distal and mid LAD stent was overlapped.     Rest of the coronary anatomy is patent and have minimal luminal irregularities. Discussed with patient. Consults: None      PHYSICAL EXAM   Visit Vitals  BP 97/56 (BP 1 Location: Right arm)   Pulse 65   Temp 98.5 °F (36.9 °C)   Resp 18   Ht 5' 6\" (1.676 m)   Wt 83.9 kg (185 lb)   SpO2 98%   Breastfeeding No   BMI 29.86 kg/m²     General: Awake, cooperative, no acute distress    HEENT: NC, Atraumatic. PERRLA, EOMI. Anicteric sclerae. Lungs:  CTA Bilaterally. No Wheezing/Rhonchi/Rales. Heart:  Regular  rhythm,  No murmur, No Rubs, No Gallops  Abdomen: Soft, Non distended, Non tender. +Bowel sounds,   Extremities: No c/c/e  Psych:   Not anxious or agitated. Neurologic:  No acute neurological deficits. Right radial artery no hematoma good pulses. Admission HPI :   Please see history and physical examination on admission. Hospital Course :  Uncomplicated. Patient have mild vasovagal dizziness with mild bradycardia and hypertension in recovery 1 hour after PCI.   Otherwise patient remained hemodynamically stable no symptoms, cardiac telemetry stable. Activity: Activity as tolerated    Diet: Cardiac Diet    Follow-up: 2-4 weeks    Disposition: home    Minutes spent on discharge: >35 minute. Labs: Results:       Chemistry Recent Labs     07/24/20 0222 07/23/20  0755   GLU 91 87    140   K 4.0 4.2    108   CO2 26 27   BUN 14 18   CREA 0.78 0.86   CA 8.0* 8.6   AGAP 6 5   BUCR 18 21*      CBC w/Diff Recent Labs     07/24/20 0222 07/23/20  0935 07/23/20  0755   WBC 6.6  --  4.8   RBC 3.86*  --  4.20   HGB 11.7* 12.1 12.7   HCT 37.1 37.2 39.6     --  178      Cardiac Enzymes No results for input(s): CPK, CKND1, CRYS in the last 72 hours. No lab exists for component: CKRMB, TROIP   Coagulation Recent Labs     07/23/20 0755   PTP 13.4   INR 1.0       Lipid Panel Lab Results   Component Value Date/Time    Cholesterol, total 201 (H) 07/23/2020 07:55 AM    HDL Cholesterol 64 (H) 07/23/2020 07:55 AM    LDL, calculated 116.4 (H) 07/23/2020 07:55 AM    VLDL, calculated 20.6 07/23/2020 07:55 AM    Triglyceride 103 07/23/2020 07:55 AM    CHOL/HDL Ratio 3.1 07/23/2020 07:55 AM      BNP No results for input(s): BNPP in the last 72 hours. Liver Enzymes No results for input(s): TP, ALB, TBIL, AP in the last 72 hours. No lab exists for component: SGOT, GPT, DBIL   Thyroid Studies No results found for: T4, T3U, TSH, TSHEXT         Significant Diagnostic Studies: No results found.             CC: Alex Head DO 24

## (undated) DEVICE — NC TREK CORONARY DILATATION CATHETER 2.5 MM X 15 MM / RAPID-EXCHANGE: Brand: NC TREK

## (undated) DEVICE — CATH GUID COR EB30 6FR 100CM -- LAUNCHER

## (undated) DEVICE — SENSOR PLSE OXMTR AD CBL L36IN ADH FRM FIT SPO2 DISP

## (undated) DEVICE — GLIDESHEATH SLENDER STAINLESS STEEL KIT: Brand: GLIDESHEATH SLENDER

## (undated) DEVICE — Device

## (undated) DEVICE — DRAPE,ANGIO,BRACH,STERILE,38X44: Brand: MEDLINE

## (undated) DEVICE — MINI TREK CORONARY DILATATION CATHETER 2.0 MM X 8 MM / RAPID-EXCHANGE: Brand: MINI TREK

## (undated) DEVICE — PRESSURE MONITORING SET: Brand: TRUWAVE

## (undated) DEVICE — STOPCOCK TRNSDUC 500PSI 3 W ROT M LUER LT BLU OFF HNDL R

## (undated) DEVICE — CATH GUID COR EB30 5FR 100CM -- LAUNCHER

## (undated) DEVICE — PROCEDURE KIT FLUID MGMT 10 FR CUST MAINFOLD

## (undated) DEVICE — DEVICE INFL W/ HEM VLV TORQ

## (undated) DEVICE — TUBING PRSS MON L24IN PVC RIG NONEXPANDING M TO FEM CONN

## (undated) DEVICE — HI-TORQUE WHISPER ES GUIDE WIRE .014 STRAIGHTTIP 3.0 CM X 190 CM: Brand: HI-TORQUE WHISPER

## (undated) DEVICE — PACK PROCEDURE SURG CATH CUST

## (undated) DEVICE — SHIELD RAD 14X16 IN W/ SCOOP ABSORBER

## (undated) DEVICE — BAND RADIAL COMPR ARTERY 24CM -- REG BX/10

## (undated) DEVICE — NC TREK CORONARY DILATATION CATHETER 2.75 MM X 15 MM / RAPID-EXCHANGE: Brand: NC TREK

## (undated) DEVICE — RADIFOCUS TORQUE DEVICE MULTI-TORQUE VISE: Brand: RADIFOCUS TORQUE DEVICE

## (undated) DEVICE — CATH DIAG FR4 EXPO 5FRX100CM -- EXPO

## (undated) DEVICE — GUIDEWIRE VASC L260CM DIA0.035IN RAD 3MM J TIP L7CM PTFE